# Patient Record
Sex: FEMALE | Race: ASIAN | NOT HISPANIC OR LATINO | URBAN - METROPOLITAN AREA
[De-identification: names, ages, dates, MRNs, and addresses within clinical notes are randomized per-mention and may not be internally consistent; named-entity substitution may affect disease eponyms.]

---

## 2017-06-01 ENCOUNTER — COMMUNICATION - HEALTHEAST (OUTPATIENT)
Dept: SCHEDULING | Facility: CLINIC | Age: 42
End: 2017-06-01

## 2017-06-02 ENCOUNTER — OFFICE VISIT - HEALTHEAST (OUTPATIENT)
Dept: FAMILY MEDICINE | Facility: CLINIC | Age: 42
End: 2017-06-02

## 2017-06-02 ENCOUNTER — HOSPITAL ENCOUNTER (OUTPATIENT)
Dept: ULTRASOUND IMAGING | Facility: HOSPITAL | Age: 42
Discharge: HOME | End: 2017-06-02
Attending: FAMILY MEDICINE

## 2017-06-02 DIAGNOSIS — N94.6 SEVERE MENSTRUAL CRAMPS: ICD-10-CM

## 2017-06-02 ASSESSMENT — MIFFLIN-ST. JEOR: SCORE: 1358.49

## 2017-06-09 ENCOUNTER — COMMUNICATION - HEALTHEAST (OUTPATIENT)
Dept: FAMILY MEDICINE | Facility: CLINIC | Age: 42
End: 2017-06-09

## 2017-07-05 ENCOUNTER — COMMUNICATION - HEALTHEAST (OUTPATIENT)
Dept: FAMILY MEDICINE | Facility: CLINIC | Age: 42
End: 2017-07-05

## 2017-10-24 ENCOUNTER — COMMUNICATION - HEALTHEAST (OUTPATIENT)
Dept: SCHEDULING | Facility: CLINIC | Age: 42
End: 2017-10-24

## 2017-10-24 ENCOUNTER — OFFICE VISIT - HEALTHEAST (OUTPATIENT)
Dept: FAMILY MEDICINE | Facility: CLINIC | Age: 42
End: 2017-10-24

## 2017-10-24 DIAGNOSIS — F43.21 GRIEF REACTION: ICD-10-CM

## 2017-10-24 DIAGNOSIS — G47.00 INSOMNIA: ICD-10-CM

## 2017-10-24 DIAGNOSIS — G44.201 ACUTE INTRACTABLE TENSION-TYPE HEADACHE: ICD-10-CM

## 2017-10-24 ASSESSMENT — MIFFLIN-ST. JEOR: SCORE: 1257.34

## 2017-10-25 ENCOUNTER — COMMUNICATION - HEALTHEAST (OUTPATIENT)
Dept: FAMILY MEDICINE | Facility: CLINIC | Age: 42
End: 2017-10-25

## 2017-10-31 ENCOUNTER — OFFICE VISIT - HEALTHEAST (OUTPATIENT)
Dept: FAMILY MEDICINE | Facility: CLINIC | Age: 42
End: 2017-10-31

## 2017-10-31 DIAGNOSIS — F43.21 GRIEF REACTION: ICD-10-CM

## 2017-10-31 DIAGNOSIS — G47.00 INSOMNIA: ICD-10-CM

## 2017-10-31 DIAGNOSIS — J02.0 STREP PHARYNGITIS: ICD-10-CM

## 2017-10-31 DIAGNOSIS — G44.201 ACUTE INTRACTABLE TENSION-TYPE HEADACHE: ICD-10-CM

## 2017-10-31 RX ORDER — HYDROXYZINE PAMOATE 50 MG/1
50 CAPSULE ORAL 3 TIMES DAILY PRN
Qty: 30 CAPSULE | Refills: 0 | Status: SHIPPED | OUTPATIENT
Start: 2017-10-31

## 2017-10-31 ASSESSMENT — MIFFLIN-ST. JEOR: SCORE: 1267.32

## 2017-11-01 ENCOUNTER — COMMUNICATION - HEALTHEAST (OUTPATIENT)
Dept: FAMILY MEDICINE | Facility: CLINIC | Age: 42
End: 2017-11-01

## 2017-11-14 ENCOUNTER — OFFICE VISIT - HEALTHEAST (OUTPATIENT)
Dept: BEHAVIORAL HEALTH | Facility: CLINIC | Age: 42
End: 2017-11-14

## 2017-11-14 DIAGNOSIS — F43.21 GRIEF REACTION: ICD-10-CM

## 2017-11-14 DIAGNOSIS — F43.23 ADJUSTMENT DISORDER WITH MIXED ANXIETY AND DEPRESSED MOOD: ICD-10-CM

## 2017-11-29 ENCOUNTER — OFFICE VISIT - HEALTHEAST (OUTPATIENT)
Dept: BEHAVIORAL HEALTH | Facility: CLINIC | Age: 42
End: 2017-11-29

## 2017-11-29 DIAGNOSIS — F43.23 ADJUSTMENT DISORDER WITH MIXED ANXIETY AND DEPRESSED MOOD: ICD-10-CM

## 2017-11-29 DIAGNOSIS — F43.21 GRIEF REACTION: ICD-10-CM

## 2017-12-05 ENCOUNTER — AMBULATORY - HEALTHEAST (OUTPATIENT)
Dept: BEHAVIORAL HEALTH | Facility: CLINIC | Age: 42
End: 2017-12-05

## 2017-12-15 ENCOUNTER — OFFICE VISIT - HEALTHEAST (OUTPATIENT)
Dept: FAMILY MEDICINE | Facility: CLINIC | Age: 42
End: 2017-12-15

## 2017-12-15 DIAGNOSIS — B18.1 HEPATITIS B CARRIER (H): ICD-10-CM

## 2017-12-15 DIAGNOSIS — Z80.0 FAMILY HISTORY OF MALIGNANT NEOPLASM OF COLON IN FIRST DEGREE RELATIVE DIAGNOSED WHEN YOUNGER THAN 60 YEARS OF AGE: ICD-10-CM

## 2017-12-15 DIAGNOSIS — Z12.4 SCREENING FOR CERVICAL CANCER: ICD-10-CM

## 2017-12-15 DIAGNOSIS — F43.21 GRIEF REACTION: ICD-10-CM

## 2017-12-15 DIAGNOSIS — Z00.00 ROUTINE GENERAL MEDICAL EXAMINATION AT A HEALTH CARE FACILITY: ICD-10-CM

## 2017-12-15 DIAGNOSIS — N94.6 DYSMENORRHEA: ICD-10-CM

## 2017-12-15 DIAGNOSIS — E66.3 OVERWEIGHT (BMI 25.0-29.9): ICD-10-CM

## 2017-12-15 DIAGNOSIS — Z86.32 HISTORY OF GESTATIONAL DIABETES: ICD-10-CM

## 2017-12-15 DIAGNOSIS — Z72.0 TOBACCO USE: ICD-10-CM

## 2017-12-15 DIAGNOSIS — G47.00 INSOMNIA: ICD-10-CM

## 2017-12-15 LAB
CHOLEST SERPL-MCNC: 232 MG/DL
FASTING STATUS PATIENT QL REPORTED: YES
HBA1C MFR BLD: 6.9 % (ref 3.5–6)
HDLC SERPL-MCNC: 37 MG/DL
LDLC SERPL CALC-MCNC: 174 MG/DL
TRIGL SERPL-MCNC: 105 MG/DL

## 2017-12-15 ASSESSMENT — MIFFLIN-ST. JEOR: SCORE: 1249.17

## 2017-12-15 NOTE — ASSESSMENT & PLAN NOTE
Severe grief reaction/ +/- adjustment disorder.  Improving with psychotherapy and time.  Continue to monitor carefully.

## 2017-12-15 NOTE — ASSESSMENT & PLAN NOTE
Pre-contemplative to quiting.  Very high stress time in her life currently, but could pursue when more socially stable.

## 2017-12-15 NOTE — ASSESSMENT & PLAN NOTE
Father had colon cancer at a young age and  from it.  Last colonoscopy for this patient was .  Colonoscopy every 5 years.

## 2017-12-15 NOTE — ASSESSMENT & PLAN NOTE
I discovered this dx in outside records after she left.  Will see if we can add on monitoring orders.

## 2017-12-15 NOTE — ASSESSMENT & PLAN NOTE
Weight up and down recently with grief.  As mental health improving, would strongly encourage weight loss.  Wt Readings from Last 3 Encounters:   12/15/17 148 lb (67.1 kg)   10/31/17 152 lb (68.9 kg)   10/24/17 150 lb 8 oz (68.3 kg)

## 2017-12-15 NOTE — ASSESSMENT & PLAN NOTE
A1C actually came back in diabetic range 6.9, fasting blood sugar in pre-diabetic range (119).  Would advise starting with lifestyle modification, could certainly add metformin now if pt wants, orhold off to response to lifestyle.

## 2017-12-19 ENCOUNTER — AMBULATORY - HEALTHEAST (OUTPATIENT)
Dept: BEHAVIORAL HEALTH | Facility: CLINIC | Age: 42
End: 2017-12-19

## 2017-12-21 ENCOUNTER — OFFICE VISIT - HEALTHEAST (OUTPATIENT)
Dept: BEHAVIORAL HEALTH | Facility: CLINIC | Age: 42
End: 2017-12-21

## 2017-12-21 DIAGNOSIS — F43.21 GRIEF REACTION: ICD-10-CM

## 2017-12-21 DIAGNOSIS — F43.23 ADJUSTMENT DISORDER WITH MIXED ANXIETY AND DEPRESSED MOOD: ICD-10-CM

## 2017-12-22 ENCOUNTER — COMMUNICATION - HEALTHEAST (OUTPATIENT)
Dept: FAMILY MEDICINE | Facility: CLINIC | Age: 42
End: 2017-12-22

## 2017-12-22 DIAGNOSIS — E11.9 TYPE 2 DIABETES MELLITUS WITHOUT COMPLICATION, WITHOUT LONG-TERM CURRENT USE OF INSULIN (H): ICD-10-CM

## 2017-12-22 DIAGNOSIS — E78.5 HYPERLIPIDEMIA: ICD-10-CM

## 2017-12-22 LAB
HBV DNA SERPL NAA+PROBE-ACNC: 5345 [IU]/ML
HBV DNA SERPL NAA+PROBE-LOG IU: 3.7 {LOG_IU}/ML

## 2018-07-30 ENCOUNTER — COMMUNICATION - HEALTHEAST (OUTPATIENT)
Dept: FAMILY MEDICINE | Facility: CLINIC | Age: 43
End: 2018-07-30

## 2018-10-09 ENCOUNTER — COMMUNICATION - HEALTHEAST (OUTPATIENT)
Dept: FAMILY MEDICINE | Facility: CLINIC | Age: 43
End: 2018-10-09

## 2019-07-16 ENCOUNTER — OFFICE VISIT - HEALTHEAST (OUTPATIENT)
Dept: FAMILY MEDICINE | Facility: CLINIC | Age: 44
End: 2019-07-16

## 2019-07-16 DIAGNOSIS — Z00.00 ROUTINE GENERAL MEDICAL EXAMINATION AT A HEALTH CARE FACILITY: ICD-10-CM

## 2019-07-16 DIAGNOSIS — E11.9 TYPE 2 DIABETES MELLITUS WITHOUT COMPLICATION (H): ICD-10-CM

## 2019-07-16 DIAGNOSIS — G43.909 MIGRAINE WITHOUT STATUS MIGRAINOSUS, NOT INTRACTABLE, UNSPECIFIED MIGRAINE TYPE: ICD-10-CM

## 2019-07-16 DIAGNOSIS — B19.10 HEPATITIS B INFECTION WITHOUT DELTA AGENT WITHOUT HEPATIC COMA, UNSPECIFIED CHRONICITY: ICD-10-CM

## 2019-07-16 DIAGNOSIS — Z23 IMMUNIZATION DUE: ICD-10-CM

## 2019-07-16 DIAGNOSIS — R12 HEARTBURN: ICD-10-CM

## 2019-07-16 LAB
ALBUMIN SERPL-MCNC: 3.8 G/DL (ref 3.5–5)
ALP SERPL-CCNC: 71 U/L (ref 45–120)
ALT SERPL W P-5'-P-CCNC: 26 U/L (ref 0–45)
ANION GAP SERPL CALCULATED.3IONS-SCNC: 9 MMOL/L (ref 5–18)
AST SERPL W P-5'-P-CCNC: 16 U/L (ref 0–40)
BILIRUB SERPL-MCNC: 0.3 MG/DL (ref 0–1)
BUN SERPL-MCNC: 7 MG/DL (ref 8–22)
CALCIUM SERPL-MCNC: 9.8 MG/DL (ref 8.5–10.5)
CHLORIDE BLD-SCNC: 102 MMOL/L (ref 98–107)
CHOLEST SERPL-MCNC: 221 MG/DL
CO2 SERPL-SCNC: 26 MMOL/L (ref 22–31)
CREAT SERPL-MCNC: 0.68 MG/DL (ref 0.6–1.1)
CREAT UR-MCNC: 57.8 MG/DL
FASTING STATUS PATIENT QL REPORTED: NO
GFR SERPL CREATININE-BSD FRML MDRD: >60 ML/MIN/1.73M2
GLUCOSE BLD-MCNC: 169 MG/DL (ref 70–125)
HBA1C MFR BLD: 8.4 % (ref 3.5–6)
HDLC SERPL-MCNC: 40 MG/DL
LDLC SERPL CALC-MCNC: 121 MG/DL
MICROALBUMIN UR-MCNC: 1.3 MG/DL (ref 0–1.99)
MICROALBUMIN/CREAT UR: 22.5 MG/G
POTASSIUM BLD-SCNC: 4 MMOL/L (ref 3.5–5)
PROT SERPL-MCNC: 7.3 G/DL (ref 6–8)
SODIUM SERPL-SCNC: 137 MMOL/L (ref 136–145)
TRIGL SERPL-MCNC: 302 MG/DL

## 2019-07-16 ASSESSMENT — MIFFLIN-ST. JEOR: SCORE: 1294.99

## 2019-07-17 LAB
HPV SOURCE: NORMAL
HUMAN PAPILLOMA VIRUS 16 DNA: NEGATIVE
HUMAN PAPILLOMA VIRUS 18 DNA: NEGATIVE
HUMAN PAPILLOMA VIRUS FINAL DIAGNOSIS: NORMAL
HUMAN PAPILLOMA VIRUS OTHER HR: NEGATIVE
SPECIMEN DESCRIPTION: NORMAL

## 2019-07-19 ENCOUNTER — COMMUNICATION - HEALTHEAST (OUTPATIENT)
Dept: FAMILY MEDICINE | Facility: CLINIC | Age: 44
End: 2019-07-19

## 2019-07-19 LAB
HBV DNA SERPL NAA+PROBE-ACNC: 2497 [IU]/ML
HBV DNA SERPL NAA+PROBE-LOG IU: 3.4 {LOG_IU}/ML

## 2019-07-22 ENCOUNTER — COMMUNICATION - HEALTHEAST (OUTPATIENT)
Dept: FAMILY MEDICINE | Facility: CLINIC | Age: 44
End: 2019-07-22

## 2019-07-23 ENCOUNTER — COMMUNICATION - HEALTHEAST (OUTPATIENT)
Dept: FAMILY MEDICINE | Facility: CLINIC | Age: 44
End: 2019-07-23

## 2019-10-01 ENCOUNTER — COMMUNICATION - HEALTHEAST (OUTPATIENT)
Dept: FAMILY MEDICINE | Facility: CLINIC | Age: 44
End: 2019-10-01

## 2019-12-09 ENCOUNTER — COMMUNICATION - HEALTHEAST (OUTPATIENT)
Dept: FAMILY MEDICINE | Facility: CLINIC | Age: 44
End: 2019-12-09

## 2020-01-07 ENCOUNTER — COMMUNICATION - HEALTHEAST (OUTPATIENT)
Dept: FAMILY MEDICINE | Facility: CLINIC | Age: 45
End: 2020-01-07

## 2020-02-17 ENCOUNTER — COMMUNICATION - HEALTHEAST (OUTPATIENT)
Dept: SCHEDULING | Facility: CLINIC | Age: 45
End: 2020-02-17

## 2020-02-18 ENCOUNTER — HOSPITAL ENCOUNTER (OUTPATIENT)
Dept: CARDIOLOGY | Facility: HOSPITAL | Age: 45
Discharge: HOME | End: 2020-02-18
Attending: FAMILY MEDICINE

## 2020-02-18 ENCOUNTER — OFFICE VISIT - HEALTHEAST (OUTPATIENT)
Dept: FAMILY MEDICINE | Facility: CLINIC | Age: 45
End: 2020-02-18

## 2020-02-18 DIAGNOSIS — N89.8 VAGINAL ITCHING: ICD-10-CM

## 2020-02-18 DIAGNOSIS — R06.83 SNORING: ICD-10-CM

## 2020-02-18 DIAGNOSIS — R00.2 PALPITATIONS: ICD-10-CM

## 2020-02-18 DIAGNOSIS — E11.9 TYPE 2 DIABETES MELLITUS WITHOUT COMPLICATION (H): ICD-10-CM

## 2020-02-18 LAB
CHOLEST SERPL-MCNC: 228 MG/DL
CLUE CELLS: NORMAL
FASTING STATUS PATIENT QL REPORTED: NO
HBA1C MFR BLD: 9.7 % (ref 3.5–6)
HDLC SERPL-MCNC: 39 MG/DL
LDLC SERPL CALC-MCNC: 144 MG/DL
TRICHOMONAS, WET PREP: NORMAL
TRIGL SERPL-MCNC: 225 MG/DL
TSH SERPL DL<=0.005 MIU/L-ACNC: 1 UIU/ML (ref 0.3–5)
YEAST, WET PREP: NORMAL

## 2020-02-18 ASSESSMENT — MIFFLIN-ST. JEOR: SCORE: 1283.93

## 2020-02-18 ASSESSMENT — PATIENT HEALTH QUESTIONNAIRE - PHQ9: SUM OF ALL RESPONSES TO PHQ QUESTIONS 1-9: 3

## 2020-02-20 ENCOUNTER — COMMUNICATION - HEALTHEAST (OUTPATIENT)
Dept: FAMILY MEDICINE | Facility: CLINIC | Age: 45
End: 2020-02-20

## 2020-02-21 ENCOUNTER — COMMUNICATION - HEALTHEAST (OUTPATIENT)
Dept: FAMILY MEDICINE | Facility: CLINIC | Age: 45
End: 2020-02-21

## 2020-02-21 ENCOUNTER — AMBULATORY - HEALTHEAST (OUTPATIENT)
Dept: FAMILY MEDICINE | Facility: CLINIC | Age: 45
End: 2020-02-21

## 2020-02-21 DIAGNOSIS — E78.2 MIXED HYPERLIPIDEMIA: ICD-10-CM

## 2020-03-30 ENCOUNTER — COMMUNICATION - HEALTHEAST (OUTPATIENT)
Dept: FAMILY MEDICINE | Facility: CLINIC | Age: 45
End: 2020-03-30

## 2020-04-03 ENCOUNTER — OFFICE VISIT - HEALTHEAST (OUTPATIENT)
Dept: FAMILY MEDICINE | Facility: CLINIC | Age: 45
End: 2020-04-03

## 2020-04-03 DIAGNOSIS — E11.9 TYPE 2 DIABETES MELLITUS WITHOUT COMPLICATION, WITHOUT LONG-TERM CURRENT USE OF INSULIN (H): ICD-10-CM

## 2020-09-10 ENCOUNTER — AMBULATORY - HEALTHEAST (OUTPATIENT)
Dept: FAMILY MEDICINE | Facility: CLINIC | Age: 45
End: 2020-09-10

## 2020-09-10 ENCOUNTER — COMMUNICATION - HEALTHEAST (OUTPATIENT)
Dept: FAMILY MEDICINE | Facility: CLINIC | Age: 45
End: 2020-09-10

## 2020-09-10 DIAGNOSIS — E78.2 MIXED HYPERLIPIDEMIA: ICD-10-CM

## 2020-09-10 DIAGNOSIS — E78.5 HYPERLIPIDEMIA, UNSPECIFIED HYPERLIPIDEMIA TYPE: ICD-10-CM

## 2020-09-10 RX ORDER — ATORVASTATIN CALCIUM 40 MG/1
40 TABLET, FILM COATED ORAL DAILY
Qty: 90 TABLET | Refills: 1 | Status: SHIPPED | OUTPATIENT
Start: 2020-09-10

## 2020-09-23 ENCOUNTER — OFFICE VISIT - HEALTHEAST (OUTPATIENT)
Dept: FAMILY MEDICINE | Facility: CLINIC | Age: 45
End: 2020-09-23

## 2020-09-23 DIAGNOSIS — N30.00 ACUTE CYSTITIS WITHOUT HEMATURIA: ICD-10-CM

## 2020-09-23 DIAGNOSIS — E11.9 TYPE 2 DIABETES MELLITUS WITHOUT COMPLICATION, WITHOUT LONG-TERM CURRENT USE OF INSULIN (H): ICD-10-CM

## 2020-09-23 DIAGNOSIS — E78.2 MIXED HYPERLIPIDEMIA: ICD-10-CM

## 2020-09-23 DIAGNOSIS — R10.84 ABDOMINAL PAIN, GENERALIZED: ICD-10-CM

## 2020-09-23 LAB
ALBUMIN SERPL-MCNC: 3.7 G/DL (ref 3.5–5)
ALBUMIN UR-MCNC: ABNORMAL MG/DL
ALP SERPL-CCNC: 78 U/L (ref 45–120)
ALT SERPL W P-5'-P-CCNC: 18 U/L (ref 0–45)
ANION GAP SERPL CALCULATED.3IONS-SCNC: 7 MMOL/L (ref 5–18)
APPEARANCE UR: ABNORMAL
AST SERPL W P-5'-P-CCNC: 12 U/L (ref 0–40)
BACTERIA #/AREA URNS HPF: ABNORMAL HPF
BASOPHILS # BLD AUTO: 0 THOU/UL (ref 0–0.2)
BASOPHILS NFR BLD AUTO: 1 % (ref 0–2)
BILIRUB SERPL-MCNC: 0.4 MG/DL (ref 0–1)
BILIRUB UR QL STRIP: NEGATIVE
BUN SERPL-MCNC: 13 MG/DL (ref 8–22)
CALCIUM SERPL-MCNC: 9.6 MG/DL (ref 8.5–10.5)
CHLORIDE BLD-SCNC: 105 MMOL/L (ref 98–107)
CHOLEST SERPL-MCNC: 198 MG/DL
CO2 SERPL-SCNC: 28 MMOL/L (ref 22–31)
COLOR UR AUTO: YELLOW
CREAT SERPL-MCNC: 0.68 MG/DL (ref 0.6–1.1)
CREAT UR-MCNC: 137.6 MG/DL
EOSINOPHIL # BLD AUTO: 0.3 THOU/UL (ref 0–0.4)
EOSINOPHIL NFR BLD AUTO: 4 % (ref 0–6)
ERYTHROCYTE [DISTWIDTH] IN BLOOD BY AUTOMATED COUNT: 12.7 % (ref 11–14.5)
FASTING STATUS PATIENT QL REPORTED: YES
GFR SERPL CREATININE-BSD FRML MDRD: >60 ML/MIN/1.73M2
GLUCOSE BLD-MCNC: 192 MG/DL (ref 70–125)
GLUCOSE UR STRIP-MCNC: ABNORMAL MG/DL
HBA1C MFR BLD: 7.9 %
HCT VFR BLD AUTO: 35.9 % (ref 35–47)
HDLC SERPL-MCNC: 44 MG/DL
HGB BLD-MCNC: 12.3 G/DL (ref 12–16)
HGB UR QL STRIP: ABNORMAL
KETONES UR STRIP-MCNC: ABNORMAL MG/DL
LDLC SERPL CALC-MCNC: 130 MG/DL
LEUKOCYTE ESTERASE UR QL STRIP: NEGATIVE
LIPASE SERPL-CCNC: 20 U/L (ref 0–52)
LYMPHOCYTES # BLD AUTO: 2.2 THOU/UL (ref 0.8–4.4)
LYMPHOCYTES NFR BLD AUTO: 29 % (ref 20–40)
MCH RBC QN AUTO: 29.1 PG (ref 27–34)
MCHC RBC AUTO-ENTMCNC: 34.1 G/DL (ref 32–36)
MCV RBC AUTO: 85 FL (ref 80–100)
MICROALBUMIN UR-MCNC: 2.01 MG/DL (ref 0–1.99)
MICROALBUMIN/CREAT UR: 14.6 MG/G
MONOCYTES # BLD AUTO: 0.5 THOU/UL (ref 0–0.9)
MONOCYTES NFR BLD AUTO: 7 % (ref 2–10)
NEUTROPHILS # BLD AUTO: 4.6 THOU/UL (ref 2–7.7)
NEUTROPHILS NFR BLD AUTO: 60 % (ref 50–70)
NITRATE UR QL: POSITIVE
PH UR STRIP: 5.5 [PH] (ref 5–8)
PLATELET # BLD AUTO: 279 THOU/UL (ref 140–440)
PMV BLD AUTO: 7.8 FL (ref 7–10)
POTASSIUM BLD-SCNC: 4.2 MMOL/L (ref 3.5–5)
PROT SERPL-MCNC: 7 G/DL (ref 6–8)
RBC # BLD AUTO: 4.21 MILL/UL (ref 3.8–5.4)
RBC #/AREA URNS AUTO: ABNORMAL HPF
SODIUM SERPL-SCNC: 140 MMOL/L (ref 136–145)
SP GR UR STRIP: >=1.03 (ref 1–1.03)
SQUAMOUS #/AREA URNS AUTO: ABNORMAL LPF
TRIGL SERPL-MCNC: 119 MG/DL
UROBILINOGEN UR STRIP-ACNC: ABNORMAL
WBC #/AREA URNS AUTO: ABNORMAL HPF
WBC CLUMPS #/AREA URNS HPF: PRESENT /[HPF]
WBC: 7.6 THOU/UL (ref 4–11)

## 2020-09-23 ASSESSMENT — MIFFLIN-ST. JEOR: SCORE: 1251.15

## 2020-09-25 LAB — BACTERIA SPEC CULT: ABNORMAL

## 2020-10-06 ENCOUNTER — OFFICE VISIT - HEALTHEAST (OUTPATIENT)
Dept: FAMILY MEDICINE | Facility: CLINIC | Age: 45
End: 2020-10-06

## 2020-10-06 DIAGNOSIS — R10.9 FLANK PAIN: ICD-10-CM

## 2020-10-06 RX ORDER — CELECOXIB 200 MG/1
200 CAPSULE ORAL 2 TIMES DAILY
Qty: 60 CAPSULE | Refills: 2 | Status: SHIPPED | OUTPATIENT
Start: 2020-10-06

## 2020-10-06 ASSESSMENT — MIFFLIN-ST. JEOR: SCORE: 1261.26

## 2021-01-07 ENCOUNTER — COMMUNICATION - HEALTHEAST (OUTPATIENT)
Dept: SCHEDULING | Facility: CLINIC | Age: 46
End: 2021-01-07

## 2021-01-08 ENCOUNTER — OFFICE VISIT - HEALTHEAST (OUTPATIENT)
Dept: FAMILY MEDICINE | Facility: CLINIC | Age: 46
End: 2021-01-08

## 2021-01-08 DIAGNOSIS — R07.9 LEFT-SIDED CHEST PAIN: ICD-10-CM

## 2021-01-08 DIAGNOSIS — E78.2 MIXED HYPERLIPIDEMIA: ICD-10-CM

## 2021-01-08 DIAGNOSIS — E11.9 TYPE 2 DIABETES MELLITUS WITHOUT COMPLICATION, WITHOUT LONG-TERM CURRENT USE OF INSULIN (H): ICD-10-CM

## 2021-01-08 DIAGNOSIS — M54.2 NECK PAIN: ICD-10-CM

## 2021-01-08 RX ORDER — CYCLOBENZAPRINE HCL 5 MG
5 TABLET ORAL 2 TIMES DAILY PRN
Qty: 20 TABLET | Refills: 0 | Status: SHIPPED | OUTPATIENT
Start: 2021-01-08

## 2021-01-08 RX ORDER — GLUCOSAMINE HCL/CHONDROITIN SU 500-400 MG
1 CAPSULE ORAL PRN
Qty: 200 STRIP | Refills: 11 | Status: SHIPPED | OUTPATIENT
Start: 2021-01-08

## 2021-01-08 ASSESSMENT — MIFFLIN-ST. JEOR: SCORE: 1268.34

## 2021-01-13 ENCOUNTER — COMMUNICATION - HEALTHEAST (OUTPATIENT)
Dept: CARDIOLOGY | Facility: CLINIC | Age: 46
End: 2021-01-13

## 2021-01-14 ENCOUNTER — OFFICE VISIT - HEALTHEAST (OUTPATIENT)
Dept: CARDIOLOGY | Facility: CLINIC | Age: 46
End: 2021-01-14

## 2021-01-14 DIAGNOSIS — E11.9 TYPE 2 DIABETES MELLITUS WITHOUT COMPLICATION (H): ICD-10-CM

## 2021-01-14 DIAGNOSIS — Z72.0 TOBACCO USE: ICD-10-CM

## 2021-01-14 DIAGNOSIS — E78.2 MIXED HYPERLIPIDEMIA: ICD-10-CM

## 2021-01-14 DIAGNOSIS — I20.89 ATYPICAL ANGINA (H): ICD-10-CM

## 2021-01-14 DIAGNOSIS — R07.89 ATYPICAL CHEST PAIN: ICD-10-CM

## 2021-01-14 LAB
ATRIAL RATE - MUSE: 74 BPM
CHOLEST SERPL-MCNC: 131 MG/DL
DIASTOLIC BLOOD PRESSURE - MUSE: NORMAL
ERYTHROCYTE [SEDIMENTATION RATE] IN BLOOD BY WESTERGREN METHOD: 25 MM/HR (ref 0–20)
FASTING STATUS PATIENT QL REPORTED: YES
HDLC SERPL-MCNC: 40 MG/DL
INTERPRETATION ECG - MUSE: NORMAL
P AXIS - MUSE: 37 DEGREES
PR INTERVAL - MUSE: 124 MS
QRS DURATION - MUSE: 100 MS
QT - MUSE: 402 MS
QTC - MUSE: 446 MS
R AXIS - MUSE: 61 DEGREES
SYSTOLIC BLOOD PRESSURE - MUSE: NORMAL
T AXIS - MUSE: -1 DEGREES
VENTRICULAR RATE- MUSE: 74 BPM

## 2021-02-11 ENCOUNTER — HOSPITAL ENCOUNTER (OUTPATIENT)
Dept: CT IMAGING | Facility: CLINIC | Age: 46
Discharge: HOME | End: 2021-02-11
Attending: INTERNAL MEDICINE

## 2021-02-11 ENCOUNTER — AMBULATORY - HEALTHEAST (OUTPATIENT)
Dept: LAB | Facility: CLINIC | Age: 46
End: 2021-02-11

## 2021-02-11 ENCOUNTER — HOSPITAL ENCOUNTER (OUTPATIENT)
Dept: CARDIOLOGY | Facility: CLINIC | Age: 46
Discharge: HOME | End: 2021-02-11
Attending: INTERNAL MEDICINE

## 2021-02-11 ENCOUNTER — COMMUNICATION - HEALTHEAST (OUTPATIENT)
Dept: CARDIOLOGY | Facility: CLINIC | Age: 46
End: 2021-02-11

## 2021-02-11 DIAGNOSIS — I20.89 ATYPICAL ANGINA (H): ICD-10-CM

## 2021-02-11 DIAGNOSIS — Z01.818 PREOPERATIVE EXAMINATION: ICD-10-CM

## 2021-02-11 LAB
AORTIC ROOT: 3 CM
AORTIC VALVE MEAN VELOCITY: 92.4 CM/S
ASCENDING AORTA: 3.5 CM
AV DIMENSIONLESS INDEX VTI: 0.8
AV MEAN GRADIENT: 4 MMHG
AV PEAK GRADIENT: 8.1 MMHG
AV VALVE AREA: 2.2 CM2
AV VELOCITY RATIO: 0.6
BSA FOR ECHO PROCEDURE: 1.7 M2
BSA FOR ECHO PROCEDURE: 1.7 M2
CREAT BLD-MCNC: 0.5 MG/DL (ref 0.6–1.1)
CV BLOOD PRESSURE: ABNORMAL MMHG
CV CALCIUM SCORE AGATSTON LM: 0
CV CALCIUM SCORING AGATSON LAD: 0
CV CALCIUM SCORING AGATSTON CX: 0
CV CALCIUM SCORING AGATSTON RCA: 0
CV CALCIUM SCORING AGATSTON TOTAL: 0
CV ECHO HEIGHT: 61 IN
CV ECHO WEIGHT: 148 LBS
DOP CALC AO PEAK VEL: 142 CM/S
DOP CALC AO VTI: 30.2 CM
DOP CALC LVOT AREA: 2.83 CM2
DOP CALC LVOT DIAMETER: 1.9 CM
DOP CALC LVOT PEAK VEL: 86.9 CM/S
DOP CALC LVOT STROKE VOLUME: 66.6 CM3
DOP CALCLVOT PEAK VEL VTI: 23.5 CM
EJECTION FRACTION: 58 % (ref 55–75)
FRACTIONAL SHORTENING: 34 % (ref 28–44)
GFR SERPL CREATININE-BSD FRML MDRD: >60 ML/MIN/1.73M2
HCG SERPL QL: NEGATIVE
INTERVENTRICULAR SEPTUM IN END DIASTOLE: 0.9 CM (ref 0.6–0.9)
IVS/PW RATIO: 0.9
LA AREA 1: 12.8 CM2
LA AREA 2: 16.6 CM2
LEFT ATRIUM LENGTH: 4.35 CM
LEFT ATRIUM SIZE: 3.2 CM
LEFT ATRIUM VOLUME INDEX: 24.4 ML/M2
LEFT ATRIUM VOLUME: 41.5 ML
LEFT VENTRICLE CARDIAC INDEX: 2.2 L/MIN/M2
LEFT VENTRICLE CARDIAC OUTPUT: 3.7 L/MIN
LEFT VENTRICLE DIASTOLIC VOLUME INDEX: 43.5 CM3/M2 (ref 29–61)
LEFT VENTRICLE DIASTOLIC VOLUME: 74 CM3 (ref 46–106)
LEFT VENTRICLE HEART RATE: 56 BPM
LEFT VENTRICLE HEART RATE: 62 BPM
LEFT VENTRICLE MASS INDEX: 100 G/M2
LEFT VENTRICLE SYSTOLIC VOLUME INDEX: 18.2 CM3/M2 (ref 8–24)
LEFT VENTRICLE SYSTOLIC VOLUME: 31 CM3 (ref 14–42)
LEFT VENTRICULAR INTERNAL DIMENSION IN DIASTOLE: 5 CM (ref 3.8–5.2)
LEFT VENTRICULAR INTERNAL DIMENSION IN SYSTOLE: 3.3 CM (ref 2.2–3.5)
LEFT VENTRICULAR MASS: 169.9 G
LEFT VENTRICULAR OUTFLOW TRACT MEAN GRADIENT: 2 MMHG
LEFT VENTRICULAR OUTFLOW TRACT MEAN VELOCITY: 57.7 CM/S
LEFT VENTRICULAR OUTFLOW TRACT PEAK GRADIENT: 3 MMHG
LEFT VENTRICULAR POSTERIOR WALL IN END DIASTOLE: 1 CM (ref 0.6–0.9)
LV STROKE VOLUME INDEX: 39.2 ML/M2
MITRAL VALVE E/A RATIO: 1.5
MV AVERAGE E/E' RATIO: 7.7 CM/S
MV DECELERATION TIME: 275 MS
MV E'TISSUE VEL-LAT: 13.4 CM/S
MV E'TISSUE VEL-MED: 7.02 CM/S
MV LATERAL E/E' RATIO: 5.9
MV MEDIAL E/E' RATIO: 11.2
MV PEAK A VELOCITY: 51 CM/S
MV PEAK E VELOCITY: 78.4 CM/S
NUC REST DIASTOLIC VOLUME INDEX: 2368 LBS
NUC REST SYSTOLIC VOLUME INDEX: 61 IN
TRICUSPID REGURGITATION PEAK PRESSURE GRADIENT: 11.3 MMHG
TRICUSPID VALVE ANULAR PLANE SYSTOLIC EXCURSION: 2.4 CM
TRICUSPID VALVE PEAK REGURGITANT VELOCITY: 168 CM/S

## 2021-02-11 ASSESSMENT — MIFFLIN-ST. JEOR
SCORE: 1258.7

## 2021-03-04 ENCOUNTER — COMMUNICATION - HEALTHEAST (OUTPATIENT)
Dept: FAMILY MEDICINE | Facility: CLINIC | Age: 46
End: 2021-03-04

## 2021-03-17 ENCOUNTER — COMMUNICATION - HEALTHEAST (OUTPATIENT)
Dept: FAMILY MEDICINE | Facility: CLINIC | Age: 46
End: 2021-03-17

## 2021-03-17 DIAGNOSIS — E11.9 TYPE 2 DIABETES MELLITUS WITHOUT COMPLICATION, WITHOUT LONG-TERM CURRENT USE OF INSULIN (H): ICD-10-CM

## 2021-03-19 ENCOUNTER — COMMUNICATION - HEALTHEAST (OUTPATIENT)
Dept: FAMILY MEDICINE | Facility: CLINIC | Age: 46
End: 2021-03-19

## 2021-05-26 ASSESSMENT — PATIENT HEALTH QUESTIONNAIRE - PHQ9: SUM OF ALL RESPONSES TO PHQ QUESTIONS 1-9: 3

## 2021-05-30 NOTE — TELEPHONE ENCOUNTER
Who is calling:  Lee Damon w/ [facility]   Reason for Call:  Calling to obtain information on Patients Pregnancy status with (+) Hepatitis findings .  No (+) Pregnancy to report   Date of last appointment with primary care: 07/16/19  Okay to leave a detailed message: Yes

## 2021-05-30 NOTE — TELEPHONE ENCOUNTER
Patient advised per provider result note below. The patient indicates understanding of the result and instructions for follow up and continued care.

## 2021-05-30 NOTE — TELEPHONE ENCOUNTER
Patient advised per provider result note below. The patient indicates understanding of the result and instructions for follow up and continued care. The patient verbalizes understanding of provider/CSS instructions for follow-up and continued care per provider message.   Patient asking MD to advise regarding other test results from 07/16/19.

## 2021-05-30 NOTE — TELEPHONE ENCOUNTER
----- Message from Gareth Kimble MD sent at 7/19/2019  2:55 PM CDT -----  Hepatitis virus number lower than a year ago.  We will continue to monitor for now.  Please notify the patient.  Thank you,    Dr. Gareth Kimble  7/19/2019 2:55 PM

## 2021-05-30 NOTE — TELEPHONE ENCOUNTER
Liver, kidney tests are within normal range. Cholesterol is high but better than before. No changes need to be made.    Dr. Gareth Kimble  7/23/2019 7:36 AM

## 2021-05-30 NOTE — TELEPHONE ENCOUNTER
Physical note indicates that PCP verified that patient is not sexually active. Last pregnancy test in April (Negative).

## 2021-05-30 NOTE — TELEPHONE ENCOUNTER
The neg pregnancy test can be conveyed to the public health communicable disease dept.   It looks like the HBV is chronic and she is a known carrier and this is not a new dx.     Dr. Ashlyn Durham  7/22/2019

## 2021-05-30 NOTE — PROGRESS NOTES
Assessment:Plan   1. Routine general medical examination at a health care facility  Pap today  - PHQ9 Depression Screen  - Gynecologic Cytology (PAP Smear)    2. Type 2 diabetes mellitus without complication (H)  Will start on metformin.  Has history of gestational diabetes, knows how to use meter and testing supplies.  New prescription sent.  A1c 8.4 today  - Glycosylated Hemoglobin A1c  - Microalbumin, Random Urine  - Comprehensive Metabolic Panel  - Lipid [place] RANDOM  - Ambulatory referral to Ophthalmology  - blood glucose meter (GLUCOMETER); Use 1 each As Directed 2 (two) times a day. Dispense glucometer brand per patient's insurance at pharmacy discretion.  Dispense: 1 each; Refill: 0  - blood glucose test strips; Use 1 each As Directed as needed. Dispense brand per patient's insurance at pharmacy discretion.Test twice a day  Dispense: 200 strip; Refill: 11  - generic lancets (FINGERSTIX LANCETS); Dispense brand per patient's insurance at pharmacy discretion. Test twice a day  Dispense: 200 each; Refill: 11  - metFORMIN (GLUCOPHAGE) 500 MG tablet; Take 1 tablet (500 mg total) by mouth 2 (two) times a day with meals.  Dispense: 180 tablet; Refill: 3    3. Hepatitis B infection without delta agent without hepatic coma, unspecified chronicity  DNA quant 5345 and 12/17.  Recheck today along with LFTs.  GI referral if indicated.  - Hepatitis B DNA Quantitative Real-Time PCR(HBQNT)    4. Migraine without status migrainosus, not intractable, unspecified migraine type  Will likely need daily prophylactic medication.  Will start at next visit.  Started on metformin for diabetes today, will try to avoid starting multiple new medications.    5. Heartburn  Will consider H. pylori or upper GI.    6. Immunization due  - Td, Preservative Free (green label)    Subjective:      Jonathan Dominguez is a 43 y.o. female who presents for an annual exam. The patient is not sexually active.    (  passed away ). The patient  participates in regular exercise: yes. The patient reports that there is not domestic violence in her life.   LMP 19, have period every month but only spotting some months.     Migraine: Long history of migraine more than 10 years.  Getting headaches 2-3 times a week.  Excedrin usually works.  No acute headache today.    Diabetes: Has history of gestational diabetes.  Last A1c was 6.9 in 2017.  Was never told she has diabetes.  She is not taking any medication.  She knows check her blood sugar with the family members meter 2 weeks ago and it was more than 300.      Feels more tired lately.  Denied depression symptoms.    Healthy Habits:   Regular Exercise: Yes, during work hrs   Sunscreen Use: No  Healthy Diet: No  Dental Visits Regularly: No  Seat Belt: Yes  Sexually active: No  Self Breast Exam Monthly:Yes  Hemoccults: N/A  Flex Sig: N/A  Colonoscopy: N/A  Lipid Profile: Yes  Glucose Screen: Yes  Prevention of Osteoporosis: N/A  Last Dexa: No  Guns at Home:  No      Immunization History   Administered Date(s) Administered     Influenza, inj, historic,unspecified 10/12/2011     Tdap 2009     Immunization status: due today.    No exam data present    Gynecologic History  Patient's last menstrual period was 2019 (exact date).  Contraception: none  Last Pap: 2015. Results were: normal  Last mammogram: N/A.       OB History    Para Term  AB Living   8 6 5 1 2 7   SAB TAB Ectopic Multiple Live Births   2 0 0 1 7      # Outcome Date GA Lbr [place]/2nd Weight Sex Delivery Anes PTL Lv   8A          EUGENE   8B          EUGENE   7 SAB  4w0d          6 SAB  14w0d             Birth Comments: D&C needed   5 Term         EUGENE   4 Term         EUGENE   3 Term         EUGENE   2 Term         EUGENE   1 Term         EUGENE       Current Outpatient Medications   Medication Sig Dispense Refill     aspirin-acetaminophen-caffeine (EXCEDRIN MIGRAINE) 250-250-65 mg per tablet Take 1 tablet by mouth every 6  (six) hours as needed for pain.       omeprazole (PRILOSEC) 10 MG capsule Take 10 mg by mouth daily before breakfast.       hydrOXYzine (VISTARIL) 50 MG capsule Take 1 capsule (50 mg total) by mouth 3 (three) times a day as needed (anxiety or insomnia). 30 capsule 0     No current facility-administered medications for this visit.      Past Medical History:   Diagnosis Date     GERD (gastroesophageal reflux disease)      Gestational diabetes      Past Surgical History:   Procedure Laterality Date     DILATION AND CURETTAGE OF UTERUS      following miscarriage     Venom-honey bee  Family History   Problem Relation Age of Onset     Heart attack Mother      Diabetes Mother      Stroke Mother      Colon cancer Father         Dx age 46     Diabetes Sister      Diabetes Brother      Diabetes Sister      No Medical Problems Brother      No Medical Problems Brother      Diabetes Son      Brain cancer Niece         DIPG     Social History     Socioeconomic History     Marital status: Single     Spouse name: Not on file     Number of children: Not on file     Years of education: Not on file     Highest education level: Not on file   Occupational History     Not on file   Social Needs     Financial resource strain: Not on file     Food insecurity:     Worry: Not on file     Inability: Not on file     Transportation needs:     Medical: Not on file     Non-medical: Not on file   Tobacco Use     Smoking status: Current Some Day Smoker     Smokeless tobacco: Never Used   Substance and Sexual Activity     Alcohol use: No     Drug use: No     Sexual activity: Not Currently     Birth control/protection: None   Lifestyle     Physical activity:     Days per week: Not on file     Minutes per session: Not on file     Stress: Not on file   Relationships     Social connections:     Talks on phone: Not on file     Gets together: Not on file     Attends Hindu service: Not on file     Active member of club or organization: Not on file      Attends meetings of clubs or organizations: Not on file     Relationship status: Not on file     Intimate partner violence:     Fear of current or ex partner: Not on file     Emotionally abused: Not on file     Physically abused: Not on file     Forced sexual activity: Not on file   Other Topics Concern     Not on file   Social History Narrative    6/1/2017 - Seven biological children, 2 step-children, 9 children total.       Review of Systems  Review of Systems      No acute fever or URI symptoms.  No acute chest pain or shortness of breath.  No vaginal discharge or irritation.  Denied depression symptoms.  Objective:         Vitals:    07/16/19 1556   BP: 120/86   Pulse: 72   Resp: 16   Temp: 98.3  F (36.8  C)   TempSrc: Oral   Weight: 159 lb 8 oz (72.3 kg)   Height: 5' (1.524 m)     Body mass index is 31.15 kg/m .    Physical  Physical Exam     Gen - alert, orientated, NAD  Eyes - fundascopic exam limited by the undialated pupil but looks symmetric  ENT - oropharynx clear, TMs clear  Neck - supple, no palpable mass or lymphadenopathy  CV - RRR, no murmur  Breast -Deferred.  Resp - lungs CTA  Ab - soft, nontender, no palpable mass or organomegaly   - normal appearance to the external genitalia, vaginal mucosa normal, physiologic discharge, no palpable adenexal mass, cervix appears normal  Extrem - warm, no edema  Neuro - CN II-XII intact, strength, sensation, reflexes intact and symmetric  Skin - no rash.  No atypical appearing lesions seen.

## 2021-05-31 VITALS — BODY MASS INDEX: 25.99 KG/M2 | WEIGHT: 156 LBS | HEIGHT: 65 IN

## 2021-05-31 VITALS — WEIGHT: 152 LBS | HEIGHT: 60 IN | BODY MASS INDEX: 29.84 KG/M2

## 2021-05-31 VITALS — WEIGHT: 148 LBS | HEIGHT: 60 IN | BODY MASS INDEX: 29.06 KG/M2

## 2021-05-31 VITALS — HEIGHT: 60 IN | WEIGHT: 150.5 LBS | BODY MASS INDEX: 29.55 KG/M2

## 2021-06-01 ENCOUNTER — RECORDS - HEALTHEAST (OUTPATIENT)
Dept: ADMINISTRATIVE | Facility: CLINIC | Age: 46
End: 2021-06-01

## 2021-06-01 NOTE — TELEPHONE ENCOUNTER
Called patient to help schedule appointment per Dr.Za caballero 7/16/2019 Return in about 4 weeks (around 8/13/2019) for diabetes. Patient agree and was schedule for November 15, 2019 at 4 pm

## 2021-06-03 VITALS — BODY MASS INDEX: 31.31 KG/M2 | HEIGHT: 60 IN | WEIGHT: 159.5 LBS

## 2021-06-04 VITALS
TEMPERATURE: 98.2 F | WEIGHT: 157.06 LBS | BODY MASS INDEX: 30.83 KG/M2 | OXYGEN SATURATION: 97 % | SYSTOLIC BLOOD PRESSURE: 130 MMHG | HEIGHT: 60 IN | DIASTOLIC BLOOD PRESSURE: 86 MMHG | HEART RATE: 93 BPM | RESPIRATION RATE: 16 BRPM

## 2021-06-04 NOTE — TELEPHONE ENCOUNTER
Called to reschedule no show DM FU appt with Dr. Kimble on 11/15/2019 left vm. Please assist in rescheduling when patient calls back.

## 2021-06-05 VITALS
WEIGHT: 148 LBS | BODY MASS INDEX: 27.96 KG/M2 | HEART RATE: 89 BPM | DIASTOLIC BLOOD PRESSURE: 80 MMHG | SYSTOLIC BLOOD PRESSURE: 122 MMHG | OXYGEN SATURATION: 98 % | RESPIRATION RATE: 16 BRPM

## 2021-06-05 VITALS
RESPIRATION RATE: 18 BRPM | HEART RATE: 88 BPM | TEMPERATURE: 97.9 F | DIASTOLIC BLOOD PRESSURE: 86 MMHG | HEIGHT: 61 IN | BODY MASS INDEX: 28.05 KG/M2 | WEIGHT: 148.56 LBS | SYSTOLIC BLOOD PRESSURE: 146 MMHG

## 2021-06-05 VITALS
HEIGHT: 61 IN | WEIGHT: 146.25 LBS | SYSTOLIC BLOOD PRESSURE: 130 MMHG | TEMPERATURE: 98.8 F | RESPIRATION RATE: 18 BRPM | DIASTOLIC BLOOD PRESSURE: 84 MMHG | HEART RATE: 68 BPM | BODY MASS INDEX: 27.61 KG/M2 | OXYGEN SATURATION: 99 %

## 2021-06-05 VITALS — BODY MASS INDEX: 27.94 KG/M2 | HEIGHT: 61 IN | WEIGHT: 148 LBS

## 2021-06-05 VITALS — WEIGHT: 148 LBS | HEIGHT: 61 IN | BODY MASS INDEX: 27.94 KG/M2

## 2021-06-05 VITALS
RESPIRATION RATE: 18 BRPM | WEIGHT: 150.13 LBS | HEART RATE: 80 BPM | DIASTOLIC BLOOD PRESSURE: 88 MMHG | HEIGHT: 61 IN | SYSTOLIC BLOOD PRESSURE: 144 MMHG | BODY MASS INDEX: 28.35 KG/M2 | TEMPERATURE: 98.4 F

## 2021-06-06 NOTE — TELEPHONE ENCOUNTER
----- Message from Gareth Kimble MD sent at 2/20/2020  2:09 PM CST -----  Hear monitor result is normal.  Please let the patient know.    Thank you,    Dr. Gareth Kimble  2/20/2020 2:09 PM

## 2021-06-06 NOTE — PROGRESS NOTES
ASSESMENT AND PLAN:  Diagnoses and all orders for this visit:    Vaginal itching  -     Wet Prep, Vaginal  Negative wet prep   Advised to use OTC vaginal creams.  Will consider gyn referral.    Type 2 diabetes mellitus without complication (H)  -     Glycosylated Hemoglobin A1c  -     Lipid [place] RANDOM  -     metFORMIN (GLUCOPHAGE) 850 MG tablet; Take 1 tablet (850 mg total) by mouth 2 (two) times a day with meals.  Dispense: 180 tablet; Refill: 2  Increased metformin to a 50 mg twice a day.  Advised to take it twice a day as prescribed.  Follow-up in a few weeks with blood sugar log.    Palpitations  -     Holter Monitor; Future; Expected date: 02/18/2020  -     Thyroid Cascade    Snoring  -     Ambulatory referral to Sleep Medicine    This transcription uses voice recognition software, which may contain typographical errors.        SUBJECTIVE: Jonathan Dominguez is a 44-year-old female with history of diabetes here with few concerns.  She is been having vaginal itching for about 3 weeks.  No excessive  vaginal discharge or odor.  She is not sexually active currently, has not been for 3 years.  No abnormal vaginal bleeding.  No dysuria frequency or blood in the urine.  She is also concerned about episodes of palpitations almost every day.  No shortness of breath or chest pain with it.  The symptoms last for few minutes.  No acute palpitations today.  She is also concerned about her snoring.  She feels tired during the day, but not every day.  She would like to see sleep specialist.  She has history of diabetes.  She is currently on metformin 500 mg twice a day.  She has not been testing her blood sugar.  She admits that she missed her medications quite frequently.  She forgot her meter and testing supplies at her sister's house, her sister will bring it back to her in a few weeks.    Past Medical History:   Diagnosis Date     GERD (gastroesophageal reflux disease)      Gestational diabetes      Patient Active Problem  List   Diagnosis     Hepatitis B carrier (H)     Tobacco use     Overweight (BMI 25.0-29.9)     Grief reaction     Family history of colon cancer in 1st degree relative, age 46     History of gestational diabetes     Type 2 diabetes mellitus without complication (H)     Hyperlipidemia     Migraine without status migrainosus, not intractable, unspecified migraine type     Heartburn       Allergies:    Allergies   Allergen Reactions     Venom-Honey Bee Anaphylaxis       Social History     Tobacco Use   Smoking Status Current Some Day Smoker   Smokeless Tobacco Never Used       Review of systems otherwise negative except as listed in HPI.   Social History     Tobacco Use   Smoking Status Current Some Day Smoker   Smokeless Tobacco Never Used       OBJECTICE: /86 (Patient Site: Left Arm, Patient Position: Sitting, Cuff Size: Adult Regular)   Pulse 93   Temp 98.2  F (36.8  C) (Oral)   Resp 16   Ht 5' (1.524 m)   Wt 157 lb 1 oz (71.2 kg)   LMP 01/15/2020 (Exact Date)   SpO2 97%   BMI 30.67 kg/m      DATA REVIEWED:    Labs Reviewed or Ordered (1):       GEN-alert,  in no apparent distress.  HEENT-mucous membranes are moist, neck is supple.  CV-regular rate and rhythm with no murmur.   RESP-lungs clear to auscultation .  ABDOMEN- Soft , not tender.  EXTREM- Sensation intact. No open lesions or ulcers.   PELVIC-normal external genitalia.  No lesions.  Vaginal wall appears normal.  No speculum exam performed.  No cervical motion tenderness.  SKIN-normal        [facility]   2/18/2020

## 2021-06-06 NOTE — TELEPHONE ENCOUNTER
"Pt calling   Sx began 3 weeks ago  Pt hoping to be seen sooner for \"vaginal itching\"  Has tried Monistat however only used for one day  Denies discharge or odor but the itching is becoming  \"unbearable \"    Warm transfer to  for appointment.  appt scheduled for am 2/18/2020    Pt in agreement with plan     [place] RN  [place] Nurse Advisor      Reason for Disposition    MODERATE-SEVERE itching (i.e., interferes with school, work, or sleep)    Protocols used: VAGINAL SYMPTOMS-A-AH      "

## 2021-06-07 NOTE — PROGRESS NOTES
"Jonathan Dominguez is a 44 y.o. female who is being evaluated via a billable telephone visit.      The patient has been notified of following:     \"This telephone visit will be conducted via a call between you and your physician/provider. We have found that certain health care needs can be provided without the need for a physical exam.  This service lets us provide the care you need with a short phone conversation.  If a prescription is necessary we can send it directly to your pharmacy.  If lab work is needed we can place an order for that and you can then stop by our lab to have the test done at a later time\"       Patient has given verbal consent to a Telephone visit? Yes    Jonathan Dominguez complains of    Chief Complaint   Patient presents with     Diabetes       I have reviewed and updated the patient's Past Medical History, Social History, Family History and Medication List.    ALLERGIES  Venom-honey bee    Additional provider notes: This visit was converted to phone encounter due to COVID 19 outbreak.  Patient states she is doing well, no medication side effects from metformin.  It was restarted at last visit.  She is not checking her blood sugar, unable to locate her meter and testing supplies.  No acute fever or URI symptoms.  No known sick contact.  No hypoglycemic symptoms.  Last A1c was 9.7 in 2/2020.    Assessment/Plan:  1. Type 2 diabetes mellitus without complication, without long-term current use of insulin (H)  New meter and testing supplies sent.   Tolerating metformin a 50 mg well, increased the dose to thousand milligrams twice daily and labs in 2 weeks.  Future lab order placed.  Patient voiced understanding and my CMA will call the patient to schedule for lab.    - blood-glucose meter Misc; Check blood sugar once a day  Dispense: 1 each; Refill: 0  - blood glucose test strips; Use 1 each As Directed as needed. Dispense brand per patient's insurance at pharmacy discretion.  Dispense: 200 strip; Refill: 11  - " generic lancets; Use 1 each As Directed as needed. Check blood sugar once a day  Dispense: 200 each; Refill: 11  - metFORMIN (GLUCOPHAGE) 1000 MG tablet; Take 1 tablet (1,000 mg total) by mouth 2 (two) times a day with meals.  Dispense: 60 tablet; Refill: 5  - Comprehensive Metabolic Panel; Future      Phone call duration:  7 minutes      Dr. Gareth Kimble  4/3/2020 3:18 PM

## 2021-06-07 NOTE — TELEPHONE ENCOUNTER
Called patientleft message to called clinic back. To Prevent the spread of  Corona virus our providers have reviewed their scheduled of upcoming patient visits to prioritize care needs related to Covid-19 as well as to ensure the health, safety and wellbeing  of other patients we are being asked to reschedule office visit to virtual on 4/3/2020 @4:40pm  will be calling patient at this time ,unless is urgent please help reschedule patient. Thank you     Anisa Massey,

## 2021-06-11 NOTE — PROGRESS NOTES
Assessment: /    Plan:    1. Abdominal pain, generalized  HM1(CBC and Differential)    Urinalysis-UC if Indicated    Lipase    HM1 (CBC with Diff)    Culture, Urine   2. Type 2 diabetes mellitus without complication, without long-term current use of insulin (H)  Comprehensive Metabolic Panel    Microalbumin, Random Urine    Glycosylated Hemoglobin A1c   3. Mixed hyperlipidemia  Lipid [place] FASTING   4. Acute cystitis without hematuria  nitrofurantoin, macrocrystal-monohydrate, (MACROBID) 100 MG capsule       Colonoscopy in November.    This was a 31 minute visit with >50% of the time spent in face-to-face counseling and coordination of care regarding: abdominal pain, diabetes, cystitis.      Subjective:    HPI:  Jonathan Dominguez is a 44-year-old female presenting with abdominal pain.  It involves the right flank.  She takes omeprazole.  She feels OK now.  Food does not affect the pain.    She also has menstrual cramps.    She had CT of the abdomen in 2018.    Weight has decreased 11# since February.    She does not have a glucometer.      Review of Systems:  No fever, vomiting, diarrhea or melena.      Current Outpatient Medications   Medication Sig Dispense Refill     aspirin-acetaminophen-caffeine (EXCEDRIN MIGRAINE) 250-250-65 mg per tablet Take 1 tablet by mouth every 6 (six) hours as needed for pain.       atorvastatin (LIPITOR) 40 MG tablet Take 1 tablet (40 mg total) by mouth daily. 90 tablet 1     blood glucose test strips Use 1 each As Directed as needed. Dispense brand per patient's insurance at pharmacy discretion. 200 strip 11     blood-glucose meter Misc Check blood sugar once a day 1 each 0     generic lancets (FINGERSTIX LANCETS) Dispense brand per patient's insurance at pharmacy discretion. Test twice a day 200 each 11     generic lancets Use 1 each As Directed as needed. Check blood sugar once a day 200 each 11     hydrOXYzine (VISTARIL) 50 MG capsule Take 1 capsule (50 mg total) by mouth 3 (three)  times a day as needed (anxiety or insomnia). 30 capsule 0     metFORMIN (GLUCOPHAGE) 1000 MG tablet Take 1 tablet (1,000 mg total) by mouth 2 (two) times a day with meals. 60 tablet 5     omeprazole (PRILOSEC) 10 MG capsule Take 10 mg by mouth daily before breakfast.       nitrofurantoin, macrocrystal-monohydrate, (MACROBID) 100 MG capsule Take 1 capsule (100 mg total) by mouth 2 (two) times a day for 7 days. 14 capsule 0     No current facility-administered medications for this visit.          Objective:    Vitals:    09/23/20 1014   BP: 130/84   Pulse:    Resp:    Temp:    SpO2:        Gen:  NAD, VSS  Lungs:  normal  Heart:  normal  Abdomen:  No HSM, mass or tenderness  Back: no CVA tenderness    WBC 7.5  UA: nitrite positive  A1c 7.9      ADDITIONAL HISTORY SUMMARIZED (2): None.  DECISION TO OBTAIN EXTRA INFORMATION (1): None.   RADIOLOGY TESTS (1): None.  LABS (1): ordered.  MEDICINE TESTS (1): None.  INDEPENDENT REVIEW (2 each): None.     Total Data Points: 1

## 2021-06-11 NOTE — TELEPHONE ENCOUNTER
Medication Question or Clarification  Who is calling: Pharmacy  What medication are you calling about (include dose and sig)?: atorvastatin (LIPITOR) 40 MG tablet  Who prescribed the medication?: Gareth Kimble MD  What is your question/concern?: Pt requesting 90 day supply. Please advise.  Requested [facility]acy: [facility]  Okay to leave a detailed message?: Yes

## 2021-06-11 NOTE — PROGRESS NOTES
"ASSESMENT AND PLAN:  Diagnoses and all orders for this visit:    Severe menstrual cramps  -     US Pelvis With Transvaginal Non OB; Future; Expected date: 6/2/17  Advised to take ibuprofen 1-2 days before her periods.  Will schedule for pap/physical and discuss US results. Gyn referral if indicated.       SUBJECTIVE: Jonathan Dominguez is here to establish care and severe menstrual cramps.   She has 1 year of \"bad menstrual cramps\" described as labor-like pains. She did not really have menstrual cramping prior to 1 year ago. She notes yesterday she was cramping all day 2 days ago and went to the ER yesterday.  Is currently on her period, started yesterday.  Her periods are somewhat irregular lately but denied heavy flow or clots. She denies vaginal discharge or concern for STD. She usually has a slight fever and diarrhea with her periods. The patient does have 7 biological children, the youngest are twins and they are 6 years old.  No history of abdominal history.      No past medical history on file.  There is no problem list on file for this patient.      Allergies:  No Known Allergies    History   Smoking Status     Current Every Day Smoker   Smokeless Tobacco     Never Used       Review of systems otherwise negative except as listed in HPI.   History   Smoking Status     Current Every Day Smoker   Smokeless Tobacco     Never Used       OBJECTICE: /84 (Patient Site: Left Arm, Patient Position: Sitting, Cuff Size: Adult Regular)  Pulse 68  Temp 98.1  F (36.7  C) (Oral)   Resp 16  Ht 5' 5\" (1.651 m)  Wt 156 lb (70.8 kg)  LMP 06/01/2017  BMI 25.96 kg/m2    DATA REVIEWED:    Radiology Tests Summarized or Ordered (1):         GEN-alert,  in no apparent distress.  HEENT-mucous membranes are moist, neck is supple.  CV-regular rate and rhythm with no murmur.   RESP-lungs clear to auscultation .  ABDOMEN- Soft , No tenderness today.  SKIN-normal        [facility]   6/2/2017   "

## 2021-06-12 NOTE — PROGRESS NOTES
"ASSESSMENT and plan   1. Flank pain  Reproducible pain noted on both flanks.  The pain is continuous although the patient complains that it is like a spasm.  We reviewed all her recent lab tests in detail including the discharge summary.  Celebrex trial to be instituted side effects of medication discussed follow up with primary doctor in 2 weeks.  She understands if the pain increases or becomes more severe or change in pattern she needs to contact me at once.  - celecoxib (CELEBREX) 200 MG capsule; Take 1 capsule (200 mg total) by mouth 2 (two) times a day.  Dispense: 60 capsule; Refill: 2      *There are no Patient Instructions on file for this visit.    No orders of the defined types were placed in this encounter.    There are no discontinued medications.    No follow-ups on file.    CHIEF COMPLAINT:  Chief Complaint   Patient presents with     Abdominal Pain     Back Pain     Pain in rectum       HISTORY OF PRESENT ILLNESS:  Jonathan is a 44 y.o. female who is here for a follow-up of side pain and potential rectal pain.  She was seen in the ER earlier this week for this pain.  She reports that the pain started on 10/2/2020 after she been treated for a urinary tract infection.  She went in because there is a wave of pain radiating from her sides down to her rectum and when she was there before she could provide a voided urine specimen she felt the pain had gone away and she felt that \"and \"stupid\" examination and test results were explained to her and she reports that the symptoms are absent for approximately 2 days and then started again last night after dinner.  She states that the pain became severe enough that she thought she might need to go to the emergency room but came here instead.    She states that the pain is present on her sides and sometimes radiates to her rectum but not to the frontal portion of her abdomen.  Her appetite is been normal she notes no fever no chills no shortness of breath.  She states " "she does not have any vaginal discharge.  She does have loose stools because of her metformin use but states that she has not felt nauseated.    REVIEW OF SYSTEMS:   Musculoskeletal positive for bilateral flank pain which radiates to the SI joints bilaterally  10 point review of  All other systems are negative.    PFSH:    Social history reviewed  She has 7 children    TOBACCO USE:  Social History     Tobacco Use   Smoking Status Current Some Day Smoker   Smokeless Tobacco Never Used       VITALS:  Vitals:    10/06/20 1013   BP: 146/86   Pulse: 88   Resp: 18   Temp: 97.9  F (36.6  C)   TempSrc: Oral   Weight: 148 lb 9 oz (67.4 kg)   Height: 5' 1\" (1.549 m)     Wt Readings from Last 3 Encounters:   10/06/20 148 lb 9 oz (67.4 kg)   10/03/20 148 lb (67.1 kg)   09/23/20 146 lb 4 oz (66.3 kg)       PHYSICAL EXAM:  Interactive female sitting comfortably exam no acute distress  HEENT neck supple mucous members moist there is no lymph enlargement noted neck  Respiratory system clear to auscultation good breath sounds no wheeze no crackles  CVS regular rate and rhythm no murmurs rubs gallops appreciated  Abdomen soft there is no focal tenderness no hepatosplenomegaly bowel sounds are present  Musculoskeletal system tenderness elicited when I palpate the costovertebral angles bilaterally.  She has no tenderness of the buttocks.  Forward flexion extension of her lumbar spine produces no discomfort      DATA REVIEWED:  Additional History from Old Records Summarized (2):   Reviewed ER visit from ED staff including differential diagnosis with patient  Decision to Obtain Records (1): 0  Radiology Tests Summarized or Ordered (1): 1  CT scan of the abdomen showed no abnormal findings.  This was discussed with the patient pelvic ultrasound showed no abnormalities.  Labs Reviewed or Ordered (1): 1  Lab test discussed in detail including negative UA, CMP and hemogram  Medicine Test Summarized or Ordered (1): 0  Independent Review of " EKG or X-RAY(2 each): 0    The visit lasted a total of 23 minutes face to face with the patient. Over 50% of the time was spent counseling and educating the patient about   Musculoskeletal flank pain additional 7 minutes were spent documenting her total of 30 minutes    Data points 4.    MEDICATIONS:  Current Outpatient Medications   Medication Sig Dispense Refill     aspirin-acetaminophen-caffeine (EXCEDRIN MIGRAINE) 250-250-65 mg per tablet Take 1 tablet by mouth every 6 (six) hours as needed for pain.       atorvastatin (LIPITOR) 40 MG tablet Take 1 tablet (40 mg total) by mouth daily. 90 tablet 1     metFORMIN (GLUCOPHAGE) 1000 MG tablet Take 1 tablet (1,000 mg total) by mouth 2 (two) times a day with meals. 60 tablet 5     omeprazole (PRILOSEC) 10 MG capsule Take 10 mg by mouth daily before breakfast.       blood glucose test strips Use 1 each As Directed as needed. Dispense brand per patient's insurance at pharmacy discretion. 200 strip 11     blood-glucose meter Misc Check blood sugar once a day 1 each 0     celecoxib (CELEBREX) 200 MG capsule Take 1 capsule (200 mg total) by mouth 2 (two) times a day. 60 capsule 2     generic lancets (FINGERSTIX LANCETS) Dispense brand per patient's insurance at pharmacy discretion. Test twice a day 200 each 11     generic lancets Use 1 each As Directed as needed. Check blood sugar once a day 200 each 11     hydrOXYzine (VISTARIL) 50 MG capsule Take 1 capsule (50 mg total) by mouth 3 (three) times a day as needed (anxiety or insomnia). 30 capsule 0     No current facility-administered medications for this visit.      Marina CAMEJO

## 2021-06-13 NOTE — PROGRESS NOTES
ASSESSMENT/PLAN:    1. Strep pharyngitis  - Rapid Strep A Screen-Throat  - amoxicillin (AMOXIL) 500 MG capsule; Take 1 capsule (500 mg total) by mouth 2 (two) times a day for 10 days.  Dispense: 20 capsule; Refill: 0    2. Insomnia  - hydrOXYzine (VISTARIL) 50 MG capsule; Take 1 capsule (50 mg total) by mouth 3 (three) times a day as needed (anxiety or insomnia).  Dispense: 30 capsule; Refill: 0    3. Grief reaction  She continues to have severe symptoms of grief, worsened by physical symptoms of severe insomnia and chronic headaches.  She will remain off of work.  She will continue to seek supportive environment.  Her 's  is this week.   I am hoping Vistaril will help when she is feeling overwhelming anxiety as well.    4. Acute intractable tension-type headache  I am hoping as she recovers from her grief and gets better sleep with Vistaril above that her headaches will improve.   I am reluctant to give her much for headache prophylaxis, as those medications would have potential side effects of making her too sleepy (which would be risky because she takes care of many small children (or impair concentration.  Will hold off for now, but would consider in the future.    Of note, I had completed some [facility] paperwork for her last week, but she has not signed her part because it had been directly faxed to us.  She signed the forms today and then we fax them with all signatures.    ADDENDUM:  Will have her meet with a mental health therapist for further evaluation and treatment (referral made).    SUBJECTIVE:  Jonathan Dominguez is a 42 y.o. female here for follow-up on grief reaction, headaches, insomnia.  She also started to develop sore throat, nasal congestion, and bilateral ear pain (right more than left ) yesterday.  No fevers or chills.  She has been exhausted with her grief, headaches, insomnia and so is not sure if her symptoms are worse with the upper respiratory symptoms or not.  She takes Excedrin for  "her symptoms, and that helps a bit.    She continues to have poor sleep.  She lays in bed for hours and only falls asleep in the middle the night for a few hours.  She takes Tylenol PM sometimes and that helps for a couple of hours.  She continues to have great deal concentrating and completing tasks due to her grief, fatigue, and headaches.  She has had long-standing headaches for years, but they are worse recently.  She feels this is due to her poor sleep.    Of note, 1 of her children had been ill with a fever recently and her strep test had been negative.    The following portions of the patient's history were reviewed and updated as appropriate: allergies, current medications and problem list  Current Outpatient Prescriptions on File Prior to Visit   Medication Sig Dispense Refill     aspirin-acetaminophen-caffeine (EXCEDRIN MIGRAINE) 250-250-65 mg per tablet Take 1 tablet by mouth every 6 (six) hours as needed for pain.       No current facility-administered medications on file prior to visit.         History   Smoking Status     Current Every Day Smoker   Smokeless Tobacco     Never Used       OBJECTIVE:  :  /78 (Patient Site: Right Arm, Patient Position: Sitting, Cuff Size: Adult Regular)  Pulse 78  Temp 98.6  F (37  C) (Oral)   Resp 20  Ht 5' 0.4\" (1.534 m)  Wt 152 lb (68.9 kg)  LMP 10/19/2017  BMI 29.29 kg/m2    Gen:  A&A, NAD  HEENT: Bilateral ear canals clear, pearly gray tympanic membranes.  Oropharynx is erythematous without induration or exudate.    Neck:  supple, no sig LAD or thyromegally  CV:  HRRR, no M/R/G  Resp:  CTAB  Ext:  W&D, no edema    Lab results:    Results for orders placed or performed in visit on 10/31/17   Rapid Strep A Screen-Throat   Result Value Ref Range    Rapid Strep A Antigen Group A Strep detected (!) No Group A Strep detected, presumptive negative           "

## 2021-06-13 NOTE — PROGRESS NOTES
ASSESSMENT/PLAN:    1. Grief reaction, Acute intractable tension-type headache, Insomnia  Patient is having extreme symptoms of grief at this time.  Because of the baseline emotional state of sorrow and grief, as well as constant headaches and insomnia with very little sleep, I think it is impossible for her to go back to work at this time.  She could not be effective at her job due to her symptoms and is not safe to do her job at this time.    I recommended that she check with her employer on her options for FMLA.  I would support FMLA at this time due to her above symptoms.  Would recommend to be off at 10-11 weeks.  If she has dramatic improvement before then, is possible that we could send her back sooner.    We did discuss the options of expectant management, medications, psychotherapy.  I do not think it is appropriate to start any antidepressants at this time given that she is experiencing grief and not true depression.  However, this may be considered in the future.  We did discuss the possibility of using a medication to help her sleep.  She prefers to just try Tylenol PM for now.  Amitriptyline might be a good option for her given her headaches and she will let me know if she would like to try this.    I will plan to see her in a few weeks to recheck on her symptoms and come up with further treatment plans.      SUBJECTIVE:  Jonathan Dominguez is a 42 y.o. female here for depression/grief.    Patient's  unexpectedly killed himself on 10/15/2017.  Since that time, she has had extreme sorrow and distress.  She is feeling overwhelmed and unable to cope.  She is crying almost all the time and cannot seem to stop.  She cannot sleep even when she has time to sleep.  She is trying to keep busy by cleaning, but is not working to take her mind off of her situation.  She has young children at home that do not understand what is happening and she has to keep trying to explain, which makes her feel worse.  She is  having constant headache that is not relieved by Excedrin Migraine, which have been helpful to her in the past.  It is hard for her to eat and to keep up on drinking fluids.    She feels like she was doing well prior to her 's suicide.  Does not think she was depressed before this.    She is tried Tylenol PM for difficulty sleeping.  It seems like it might help for an hour or 2 but then she is back up.  She also has a lot of cultural rituals that she needs to attend to during the middle the night, sometimes even if she could sleep she is being awoken.    She tried to go back to work yesterday, but was too distraught and unable to concentrate and went home after a couple of hours.    She knows that her concentration is very poor.  She feels terrible because earlier today she accidentally drove by a school bus that it had flashing lights and she did not realize she should have stopped until she is already gone by.  She is worried that it could be dangerous for her to do things because of her severely compromised concentration and attention.      PHQ-2 Total Score: 6 (10/24/2017  2:00 PM)    PHQ-9 Total Score: 25 (10/24/2017  2:00 PM)   Little interest or pleasure in doing things: Nearly every day  Feeling down, depressed, or hopeless: Nearly every day  Trouble falling or staying asleep, or sleeping too much: Nearly every day  Feeling tired or having little energy: Nearly every day  Poor appetite or overeating: Nearly every day  Feeling bad about yourself - or that you are a failure or have let yourself or your family down: Nearly every day  Trouble concentrating on things, such as reading the newspaper or watching television: Nearly every day  Moving or speaking so slowly that other people could have noticed. Or the opposite - being so fidgety or restless that you have been moving around a lot more than usual: Nearly every day  Thoughts that you would be better off dead, or of hurting yourself in some way:  "Several days  PHQ-9 Total Score: 25  If you checked off any problems, how difficult have these problems made it for you to do your work, take care of things at home, or get along with other people?: Very difficult     The following portions of the patient's history were reviewed and updated as appropriate: allergies, current medications and problem list  Current Outpatient Prescriptions on File Prior to Visit   Medication Sig Dispense Refill     aspirin-acetaminophen-caffeine (EXCEDRIN MIGRAINE) 250-250-65 mg per tablet Take 1 tablet by mouth every 6 (six) hours as needed for pain.       No current facility-administered medications on file prior to visit.          History   Smoking Status     Current Every Day Smoker   Smokeless Tobacco     Never Used       OBJECTIVE:  :  /86 (Patient Site: Right Arm, Patient Position: Sitting, Cuff Size: Adult Regular)  Pulse 78  Temp 98.3  F (36.8  C) (Oral)   Resp 16  Ht 5' 0.2\" (1.529 m)  Wt 150 lb 8 oz (68.3 kg)  LMP 10/19/2017  BMI 29.2 kg/m2  Wt Readings from Last 3 Encounters:   10/24/17 150 lb 8 oz (68.3 kg)   06/02/17 156 lb (70.8 kg)   06/01/17 155 lb (70.3 kg)         Gen:  A&A, NAD.  She appears exhausted.  She is tearful at times.  Her eyes are puffy.  She communicates effectively.        "

## 2021-06-14 NOTE — PROGRESS NOTES
This is patient's first diagnostic intake session. Insufficient information was collected during this encounter to complete a full diagnostic assessment. Patient presented to session following a referral by Dr. Kimble. Patient presents reporting problems and symptoms including a strong grief reaction to the recent death of her  on 10/15/2017. Patient was not able to complete the intake form or inventories prior to today's appointment. The consent for service was reviewed with the patient. Patient verbally indicated understanding contents of the consent for service and provided signature on the form. The therapist will complete a thorough review of patient's medical record. Therapist will obtain a release of information for any community medical or mental health provider and/or service. This writer and patient will discuss patient treatment goals and initiate an individual treatment plan at follow-up encounter.

## 2021-06-14 NOTE — TELEPHONE ENCOUNTER
Called pt to follow up and scheduled pt for EDF with PCP tomorrow at 1220 pm.  Also scheduled establish physical for pt son (new pt to [facility]).  Thanks.

## 2021-06-14 NOTE — PROGRESS NOTES
The therapist has received voicemail messages from [email or web address] calling on behalf of [employer] company on the following dates: 12/7/17, 12/11/17, 12/13/17, 12/14/17, 12/15/17 and 12/18/17. As already indicated in a documentation note from 12/5/2017, the therapist informed the agent that request for medical records must be requested through [employer]. The therapist provided the agent with the appropriate contact information in order to request medical records during previous phone call encounters. However, the agent continued to call and leave a VM on the dates listed above. The therapist called the agent on 12/18/2017 and requested to speak with a supervisor through the company. The agent initially provided the therapist with the wrong number. The therapist called again and received a different number: [phone]. The therapist called to speak with a supervisor on 12/19/2017 at 3:10pm. The therapist spoke with a supervisor and explained the situation also providing the agent's name. The supervisor reported that documentation would be filed for the case and received the contact information for [employer] again in order to request medical records. No further action required for the therapist at this time.

## 2021-06-14 NOTE — PROGRESS NOTES
"Mental Health Visit Note    This is a dual signature report. My supervising clinician is BELLA Plata.    12/21/2017    Start time: 10:00am    Stop Time: 11:00am   Session # 1 (post DA)    Jonathan Dominguez is a 42 y.o. female is being seen today for    Chief Complaint   Patient presents with     MH Follow Up     Psychotherapy follow-up visit to address grief and loss     New symptoms or complaints: None    Functional Impairment:   Personal: 4  Family: 3  Work: 4  Social:3    Clinical assessment of mental status:   Grooming: Well groomed  Attire: Appropriate  Age: Appears Stated  Behavior Towards Examiner: Cooperative  Motor Activity: Within normal   Eye Contact: Appropriate  Mood: Sad  Affect: Congruent w/content of speech and Tearful  Speech/Language: Within normal  Attention: Within normal  Concentration: Brief  Thought Process: Within normal  Thought Content: Hallucinations: none reported  Delusions: none reported  Orientation: X 3  Memory: No Evidence of Impairment  Judgement: No Evidence of Impairment  Estimated Intelligence: Average  Demonstrated Insight: Adequate  Fund of Knowledge: adequate      Suicidal/Homicidal Ideation present: None Reported This Session    Patient's impression of their current status:   The patient reported that she was feeling okay and \"doing better.\" She shared that she still has hard days but is learning not to blame herself as much and is trying to forgive. She stated, \"this is life - stuff like this happens.\" The patient shared that she is focused on \"making baby steps\" every day and acknowledged that healing will take time.   The patient shared that she was \"trying not to cry today\" because she has been crying a lot. She acknowledged that crying does help her feel better emotionally but physically causes more headaches. She reported that she has been experiencing increased migraines. The therapist and patient processed how she might be able to continue balancing the need for " "emotional relief while managing increased migraines which impair functioning.   The patient discussed ways in which her grief is impacting her role as a parent. She stated, \"I don't want my kids to feel bad\" when they see her crying. The patient identified cultural influences and how Hmong parents' are responsible for their kids' feelings. Thus, she doesn't want her children to feel badly or be reminded of their dad because of her. The patient acknowledged that her youngest children are also scared that something may happen to her. She reported efforts to hide her sadness from her kids so they don't feel scared.   *The patient mentioned that the \"hardest thing is still how he passed.\"     Therapist impression of patients current state:   The patient presented on-time for a follow-up psychotherapy appointment. She appeared cooperative and open-minded throughout today's visit. The patient appeared receptive to therapist feedback and suggestions.   The therapist and patient discussed ways in which the patient could work to normalize the grieving process for and with her children. For instance, it will be important for the patient to model appropriate management of emotions while acknowledging the challenges of grief (as opposed to completely hiding her emotions or avoiding any discussion of the death with her children). The therapist suggested that the children receive additional support at school or in the community to help with the grieving process. That way, the patient would also be supported in attempting to manage the emotional impact the death of her  has had on the children especially while the patient is also grieving. The therapist attempted to help reduce stigma of the grief reaction and highlight ways in which the patient is appropriately navigating the stages. The therapist attempted to challenge the patient's avoidance patterns and instill hope for healing. The therapist supports the patient's " decision to return to work and believes this will be a positive step in returning to a healthy routine. The therapist will help the patient navigate any unforseen barriers that arise as she transitions back into her position. 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 The therapist provided psycho-education about the importance of sleep and will continue to encourage the patient to prioritize a healthy sleep routine as she returns to work.   The therapist will encourage the patient to participate in more grief work during future sessions.    Type of psychotherapeutic technique provided: Insight oriented, CBT and grief therapy    Progress toward short term goals:Progress as expected, as patient adequately engaged in the grieving process during today's session    Review of long term goals: Patient plans to return to work on 1/2/2018.    Diagnosis:   1. Adjustment disorder with mixed anxiety and depressed mood    2. Grief reaction      Plan and Follow up: Patient is scheduled to return for a follow-up session on 1/19/2018. Patient requested a return to work letter.     Discharge Criteria/Planning: Patient will continue with follow-up until therapy can be discontinued without return of signs and symptoms.    Carol Rosales 12/21/2017    Performed and documented by JUAN CARLOS Bustamante    I have read, discussed and reviewed the documentation as presented by JUAN CARLOS Bustamante LICSW    This note was created with help of Dragon dictation software. Grammatical / typing errors are not intentional and inherent to the software.

## 2021-06-14 NOTE — PROGRESS NOTES
Therapist has been contacted several times by [email or web address] on behalf of [employer] inquiring about patient's medical records. Therapist called agency on 12/5/2017 at 11:45am and informed agent that medical records must be requested through [employer]. Therapist provided phone and fax number. Agent shared that all medical records were needed in regards to a claim filed for short-term disability for the following dates: 10/1/2017 to present. Therapist reiterated that medical records must be requested through [employer] services. Agent unable to clarify if therapist needed to submit paperwork independently to assist with the process.

## 2021-06-14 NOTE — TELEPHONE ENCOUNTER
Wellness Screening Tool  Symptom Screening:  Do you have one of the following NEW symptoms:    Fever (subjective or >100.0)?  No    A new cough?  No    Shortness of breath?  No     Chills? No     New loss of taste or smell? No     Generalized body aches? No     New persistent headache? No     New sore throat? No     Nausea, vomiting, or diarrhea?  No    Within the past 2 weeks, have you been exposed to someone with a known positive illness below:    COVID-19 (known or suspected)?  No    Chicken pox?  No    Mealses?  No    Pertussis?  No    Patient notified of visitor policy- No visitors are allowed to accompany the patient at this time. Yes  Pt informed to wear a mask: Yes  Pt notified if they develop any symptoms listed above, prior to their appointment, they are to call the clinic directly at [phone] for further instructions.  Yes  Patient's appointment status: Patient will be seen in clinic as scheduled on 1/14

## 2021-06-14 NOTE — PROGRESS NOTES
ASSESMENT AND PLAN:  Diagnoses and all orders for this visit:    Left-sided chest pain  -     [facility]  Left-sided chest pain radiating to left arm.  Referred to [facility] clinic due to underlying medical conditions and continues symptoms after ED discharge.    Neck pain  -     cyclobenzaprine (FLEXERIL) 5 MG tablet; Take 1 tablet (5 mg total) by mouth 2 (two) times a day as needed for muscle spasms.  Dispense: 20 tablet; Refill: 0    Type 2 diabetes mellitus without complication, without long-term current use of insulin (H)  -     blood glucose test strips; Use 1 each As Directed as needed. Dispense brand per patient's insurance at pharmacy discretion.  Dispense: 200 strip; Refill: 11  -     blood-glucose meter Misc; Check blood sugar once a day  Dispense: 1 each; Refill: 0  -     generic lancets; Use 1 each As Directed as needed. Check blood sugar once a day  Dispense: 200 each; Refill: 11  Month meter and testing supplies sent.  She will start checking her fasting blood sugar daily.  Last A1c 7.9 in 9/2020. Recheck at next visit.    Mixed hyperlipidemia  Continue current medication.   in 9/20.  Recheck at next visit.    This transcription uses voice recognition software, which may contain typographical errors.      SUBJECTIVE: Jonathan Dominguez is a 45-year-old female with history of diabetes and hyperlipidemia here for ED follow-up.  She was seen in the ED on 1/5/2021, 3 days ago due to chest pain.  Cardiac work-up was negative.  She was recommended to follow-up with PCP for outpatient stress test.  She is still having constant left-sided chest pain occasionally radiates to her left arm.  No palpitations or shortness of breath.  She also has history of heartburn.  She is worried that she might have heart attack.    She has not been able to check her blood sugar lately.  She cannot find her old meter.  Prescription sent was not covered by her insurance.  She goes to her new  "pharmacy now.  States she takes all her medications as prescribed.    She recovered from Covid in October.    Past Medical History:   Diagnosis Date     GERD (gastroesophageal reflux disease)      Gestational diabetes      Patient Active Problem List   Diagnosis     Hepatitis B carrier (H)     Tobacco use     Overweight (BMI 25.0-29.9)     Grief reaction     Family history of colon cancer in 1st degree relative, age 46     History of gestational diabetes     Type 2 diabetes mellitus without complication (H)     Migraine without status migrainosus, not intractable, unspecified migraine type     Heartburn     Mixed hyperlipidemia       Allergies:    Allergies   Allergen Reactions     Venom-Honey Bee Anaphylaxis       Social History     Tobacco Use   Smoking Status Current Some Day Smoker   Smokeless Tobacco Never Used       Review of systems otherwise negative except as listed in HPI.   Social History     Tobacco Use   Smoking Status Current Some Day Smoker   Smokeless Tobacco Never Used       OBJECTICE: /88   Pulse 80   Temp 98.4  F (36.9  C) (Oral)   Resp 18   Ht 5' 1\" (1.549 m)   Wt 150 lb 2 oz (68.1 kg)   LMP 12/16/2020   BMI 28.37 kg/m      DATA REVIEWED:  Additional History from Old Records Summarized (2):   Radiology Tests Summarized or Ordered (1): CTA chest abdomen pelvis done on 1/5/2021 was unremarkable.  Labs Reviewed or Ordered (1):       GEN-alert,  in no apparent distress.  HEENT-neck is supple.  CV-regular rate and rhythm with no murmur.   RESP-lungs clear to auscultation .  ABDOMEN- Soft , not tender.  Mild epigastric tenderness.  EXTREM- No edema.  SKIN-normal        Gareth Kimble   1/8/2021   "

## 2021-06-14 NOTE — TELEPHONE ENCOUNTER
"Chest pains  6-7/10  Constant    Seen yesterday in ED    Pain has not moved to the left, also has a \"numb aching\" in her left arm    No shortness of breath     Triaged to a disposition of Go to ED     COVID 19 Nurse Triage Plan/Patient Instructions    Please be aware that novel coronavirus (COVID-19) may be circulating in the community. If you develop symptoms such as fever, cough, or SOB or if you have concerns about the presence of another infection including coronavirus (COVID-19), please contact your health care provider or visit [email or web address].     Disposition/Instructions    ED Visit recommended. Follow protocol based instructions.      Bring Your Own Device:  Please also bring your smart device(s) (smart phones, tablets, laptops) and their charging cables for your personal use and to communicate with your care team during your visit.      Thank you for taking steps to prevent the spread of this virus.  o Limit your contact with others.  o Wear a simple mask to cover your cough.  o Wash your hands well and often.    Resources    [facility]: About COVID-19: [email or web address]    CDC: What to Do If You're Sick: www.cdc.gov/coronavirus/2019-ncov/about/steps-when-sick.html    CDC: Ending Home Isolation: www.cdc.gov/coronavirus/2019-ncov/hcp/disposition-in-home-patients.html     CDC: Caring for Someone: www.cdc.gov/coronavirus/2019-ncov/if-you-are-sick/care-for-someone.html     [facility]: Interim Guidance for Hospital Discharge to Home: www.health.[facility].[place]/diseases/coronavirus/hcp/hospdischarge.pdf    [facility] clinical trials (COVID-19 research studies): [email or web address].[facility]/umn-clinical-trials     Below are the COVID-19 hotlines at the [place] Department of [facility] ([facility]). Interpreters are available.   o For health questions: Call 740-654-3183 or 1-699.258.1341 (7 a.m. to 7 p.m.)  o For questions about schools and childcare: Call 892-307-5721 or 1-955.644.1196 (7 a.m. to 7 p.m.) " "    Reason for Disposition    Pain also present in shoulder(s) or arm(s) or jaw  (Exception: pain is clearly made worse by movement)    Additional Information    Negative: Severe difficulty breathing (e.g., struggling for each breath, speaks in single words)    Negative: Difficult to awaken or acting confused (e.g., disoriented, slurred speech)    Negative: Shock suspected (e.g., cold/pale/clammy skin, too weak to stand, low BP, rapid pulse)    Negative: [1] Chest pain lasts > 5 minutes AND [2] history of heart disease  (i.e., heart attack, bypass surgery, angina, angioplasty, CHF; not just a heart murmur)    Negative: [1] Chest pain lasts > 5 minutes AND [2] described as crushing, pressure-like, or heavy    Negative: [1] Chest pain lasts > 5 minutes AND [2] age > 50    Negative: [1] Chest pain lasts > 5 minutes AND [2] age > 30 AND [3] at least one cardiac risk factor (i.e., hypertension, diabetes, obesity, smoker or strong family history of heart disease)    Negative: [1] Chest pain lasts > 5 minutes AND [2] not relieved with nitroglycerin    Negative: Passed out (i.e., lost consciousness, collapsed and was not responding)    Negative: Heart beating < 50 beats per minute OR > 140 beats per minute    Negative: Visible sweat on face or sweat dripping down face    Negative: Sounds like a life-threatening emergency to the triager    Negative: Followed a chest injury    Negative: SEVERE chest pain    Negative: [1] Intermittent  chest pain or \"angina\" AND [2] increasing in severity or frequency  (Exception: pains lasting a few seconds)    Protocols used: CHEST PAIN-A-    Michelle Naylor RN Triage Nurse Advisor    "

## 2021-06-14 NOTE — PROGRESS NOTES
Assessment/Plan:     Problem List Items Addressed This Visit     Hepatitis B carrier     I discovered this dx in outside records after she left.  Will see if we can add on monitoring orders.         Relevant Orders    Hepatitis B DNA Quantitative Real-Time PCR(HBQNT)($$$)    Tobacco use     Pre-contemplative to quiting.  Very high stress time in her life currently, but could pursue when more socially stable.         Overweight (BMI 25.0-29.9)     Weight up and down recently with grief.  As mental health improving, would strongly encourage weight loss.  Wt Readings from Last 3 Encounters:   12/15/17 148 lb (67.1 kg)   10/31/17 152 lb (68.9 kg)   10/24/17 150 lb 8 oz (68.3 kg)             Grief reaction     Severe grief reaction/ +/- adjustment disorder.  Improving with psychotherapy and time.  Continue to monitor carefully.         Family history of colon cancer in 1st degree relative, age 46     Father had colon cancer at a young age and  from it.  Last colonoscopy for this patient was .  Colonoscopy every 5 years.           History of gestational diabetes     A1C actually came back in diabetic range 6.9, fasting blood sugar in pre-diabetic range (119).  Would advise starting with lifestyle modification, could certainly add metformin now if pt wants, or hold off to response to lifestyle.         Relevant Orders    Glycosylated Hemoglobin A1c (Completed)    Comprehensive Metabolic Panel (Completed)      Other Visit Diagnoses     Routine general medical examination at a health care facility    -  Primary    Relevant Orders    Lipid Cascade (Completed)    Screening for cervical cancer        Found results from [facility]; last Pap Smear 9/11/15; next due .    Dysmenorrhea        No recurrence in last several months. Does have small uterine fibroid, but need not pursue therapy given asymptomatic.    Relevant Orders    HM2(CBC w/o Differential) (Completed)    Insomnia        Improving with improving  grief.    Relevant Orders    Thyroid [place]            Health Maintenance   Topic Date Due     ADVANCE DIRECTIVES DISCUSSED WITH PATIENT  10/07/1994     INFLUENZA VACCINE RULE BASED (1) 2017     TD 18+ HE  2019     PAP SMEAR  2020     COLONOSCOPY  2020     TDAP ADULT ONE TIME DOSE  Completed            Subjective:      Jonathan Dominguez is a 42 y.o. female who presents for an annual exam. The patient is not sexually active. The patient participates in regular exercise: no. The patient reports that there is not domestic violence in her life.     Healthy Habits:   Regular Exercise: No  Sunscreen Use: No  Healthy Diet: Yes  Dental Visits Regularly: Yes  Seat Belt: Yes  Sexually active: No  Self Breast Exam Monthly:Yes  Hemoccults: N/A  Flex Sig: N/A  Colonoscopy: N/A  Lipid Profile: No  Glucose Screen: 10/24/14  Prevention of Osteoporosis: N/A  Last Dexa: N/A  Guns at Home:  No      Immunization History   Administered Date(s) Administered     Influenza, inj, historic,unspecified 10/12/2011     Tdap 2009     Immunization status: up to date and documented.    No exam data present    Gynecologic History  Patient's last menstrual period was 2017 (approximate).  Contraception: none  Last Pap:   Last mammogram: n/a    Obstetric History       T5      L7     SAB2   TAB0   Ectopic0   Multiple1   Live Births7       # Outcome Date GA Lbr [place]/2nd Weight Sex Delivery Anes PTL Lv   8A          EUGENE   8B          EUGENE   7 SAB  4w0d          6 SAB  14w0d          5 Term         EUGENE   4 Term         EUGENE   3 Term         EUGENE   2 Term         EUGENE   1 Term         EUGENE              Current Outpatient Prescriptions   Medication Sig Dispense Refill     aspirin-acetaminophen-caffeine (EXCEDRIN MIGRAINE) 250-250-65 mg per tablet Take 1 tablet by mouth every 6 (six) hours as needed for pain.       hydrOXYzine (VISTARIL) 50 MG capsule Take 1 capsule (50 mg total) by mouth 3 (three)  "times a day as needed (anxiety or insomnia). 30 capsule 0     No current facility-administered medications for this visit.      Past Medical History:   Diagnosis Date     Gestational diabetes      Past Surgical History:   Procedure Laterality Date     DILATION AND CURETTAGE OF UTERUS      following miscarriage     Venom-honey bee  Family History   Problem Relation Age of Onset     Heart attack Mother      Diabetes Mother      Stroke Mother      Colon cancer Father      Dx age 46     Diabetes Sister      Diabetes Brother      Diabetes Sister      No Medical Problems Brother      No Medical Problems Brother      Diabetes Son      Brain cancer Niece      DIPG     Social History     Social History     Marital status: Single     Spouse name: N/A     Number of children: N/A     Years of education: N/A     Occupational History     Not on file.     Social History Main Topics     Smoking status: Current Some Day Smoker     Smokeless tobacco: Never Used     Alcohol use No     Drug use: No     Sexual activity: Not Currently     Birth control/ protection: None     Other Topics Concern     Not on file     Social History Narrative    6/1/2017 - Seven biological children, 2 step-children, 9 children total.       Review of Systems  Review of Systems     Complete review of systems is negative unless previously stated.       Objective:         Physical  Physical Exam         Vitals:    12/15/17 1339   BP: 126/74   Pulse: 64   Resp: 20   Temp: 98.5  F (36.9  C)   TempSrc: Oral   Weight: 148 lb (67.1 kg)   Height: 5' 0.4\" (1.534 m)     Body mass index is 28.52 kg/(m^2).    Physical Exam:  General Appearance: Alert, cooperative, no distress, appears stated age  Head: Normocephalic, without obvious abnormality, atraumatic  Eyes: PERRL, conjunctiva/corneas clear, EOM's intact  Ears: Normal TM's and external ear canals, both ears  Throat: Lips, mucosa, and tongue normal; teeth and gums normal  Neck: Supple, symmetrical, trachea midline, no " adenopathy;  thyroid: not enlarged, symmetric, no tenderness/mass/nodules  Back: Symmetric, no curvature, ROM normal, no CVA tenderness  Lungs: Clear to auscultation bilaterally, respirations unlabored  Breasts: No breast masses, tenderness, asymmetry, or nipple discharge.  Heart: Regular rate and rhythm, S1 and S2 normal, no murmur, rub, or gallop,   Abdomen: Soft, non-tender, no masses, no organomegaly  Pelvic:Declined  Extremities: Extremities normal, atraumatic, no cyanosis or edema  Skin: Skin color, texture, turgor normal, no rashes or lesions  Lymph nodes: Cervical and axillary nodes normal  Neurologic: Normal patellar DTR's, normal gait      Results for orders placed or performed in visit on 12/15/17   Glycosylated Hemoglobin A1c   Result Value Ref Range    Hemoglobin A1c 6.9 (H) 3.5 - 6.0 %   Lipid Cascade   Result Value Ref Range    Cholesterol 232 (H) <=199 mg/dL    Triglycerides 105 <=149 mg/dL    HDL Cholesterol 37 (L) >=50 mg/dL    LDL Calculated 174 (H) <=129 mg/dL    Patient Fasting > 8hrs? Yes    Comprehensive Metabolic Panel   Result Value Ref Range    Sodium 140 136 - 145 mmol/L    Potassium 3.9 3.5 - 5.0 mmol/L    Chloride 106 98 - 107 mmol/L    CO2 26 22 - 31 mmol/L    Anion Gap, Calculation 8 5 - 18 mmol/L    Glucose 119 70 - 125 mg/dL    BUN 11 8 - 22 mg/dL    Creatinine 0.65 0.60 - 1.10 mg/dL    GFR MDRD Af Amer >60 >60 mL/min/1.73m2    GFR MDRD Non Af Amer >60 >60 mL/min/1.73m2    Bilirubin, Total 0.7 0.0 - 1.0 mg/dL    Calcium 9.4 8.5 - 10.5 mg/dL    Protein, Total 7.2 6.0 - 8.0 g/dL    Albumin 3.5 3.5 - 5.0 g/dL    Alkaline Phosphatase 72 45 - 120 U/L    AST 14 0 - 40 U/L    ALT 29 0 - 45 U/L   HM2(CBC w/o Differential)   Result Value Ref Range    WBC 9.1 4.0 - 11.0 thou/uL    RBC 4.51 3.80 - 5.40 mill/uL    Hemoglobin 13.5 12.0 - 16.0 g/dL    Hematocrit 40.2 35.0 - 47.0 %    MCV 89 80 - 100 fL    MCH 30.0 27.0 - 34.0 pg    MCHC 33.6 32.0 - 36.0 g/dL    RDW 11.5 11.0 - 14.5 %    Platelets  269 140 - 440 thou/uL    MPV 7.7 7.0 - 10.0 fL

## 2021-06-14 NOTE — PROGRESS NOTES
Consultation - [facility]  Jonathan Dominguez,  1975, MRN [id number]    PCP: Gareth Schwartz MD, [phone]    Assessment and Plan: Chest pain.  Symptom complex may be consistent with angina and some characters but others as seems atypical.  Its positional character and prolonged persistent duration does not suggest an anginal pattern.  Another circumstance of the character of the pain could suggest angina she has multiple risk factors.  Given recent Covid infection consideration would be whether she may have sustained pericarditis that sharp component of the pain positional character association with laughing and changing position may suggest pericarditic origin.  Recommendations: I think we need to pursue evaluation for multiple causes.  CT angiogram to discern whether or not the coronary disease is present.  Would obtain serologic measures to look for a pericarditis.  Cardiac echo and obtain ECG today.    Chief Complaint: Chest pain syndrome    HPI:  We have been requested by Dr schwartz To evaluate Jonathan Dominguez for consultation who is a  45 y.o. year old female for above chief complaint.  Hx .  45-year-old woman with no previous heart disease.  She recently had Covid infection.  In the last several weeks that she has noted a new onset of the chest discomfort.  She describes it as both a dull and sharp pain is in the mid center of the chest sometimes extending up into the neck.  The pain can be sharp on occasion.  She notes that when breathing heavily or laughing the pain intensifies it seems to have been worse when she lay down.  The pain was constant it was not intermittent it did not come and go and was persistently present at all times.  At 1 point became very uncomfortable she came to the ER for evaluation was assessed and subsequently released.  ECG did show nonspecific change.  Since that time the pain has progressively improved.  She states today that there is no pain.  She thinks maybe when she was  exerting herself the pain became more prominent.  She has a very distant history of a syncopal event but no recent history of syncope no palpitations regular heartbeats dyspnea thought any PND peripheral edema.  She has history of hypercholesterolemia on therapy.  She is history of diabetes on therapy.  No history of hypertension although on occasion and under stress blood pressure is elevated.  His blood pressure is normal today.  History of smoking.  Family history of heart disease with mother with MI in her 50s      Current Outpatient Medications:      aspirin-acetaminophen-caffeine (EXCEDRIN MIGRAINE) 250-250-65 mg per tablet, Take 1 tablet by mouth every 6 (six) hours as needed for pain., Disp: , Rfl:      atorvastatin (LIPITOR) 40 MG tablet, Take 1 tablet (40 mg total) by mouth daily., Disp: 90 tablet, Rfl: 1     blood glucose test strips, Use 1 each As Directed as needed. Dispense brand per patient's insurance at pharmacy discretion., Disp: 200 strip, Rfl: 11     blood-glucose meter Misc, Check blood sugar once a day, Disp: 1 each, Rfl: 0     celecoxib (CELEBREX) 200 MG capsule, Take 1 capsule (200 mg total) by mouth 2 (two) times a day., Disp: 60 capsule, Rfl: 2     cyclobenzaprine (FLEXERIL) 5 MG tablet, Take 1 tablet (5 mg total) by mouth 2 (two) times a day as needed for muscle spasms., Disp: 20 tablet, Rfl: 0     generic lancets (FINGERSTIX LANCETS), Dispense brand per patient's insurance at pharmacy discretion. Test twice a day, Disp: 200 each, Rfl: 11     generic lancets, Use 1 each As Directed as needed. Check blood sugar once a day, Disp: 200 each, Rfl: 11     hydrOXYzine (VISTARIL) 50 MG capsule, Take 1 capsule (50 mg total) by mouth 3 (three) times a day as needed (anxiety or insomnia)., Disp: 30 capsule, Rfl: 0     metFORMIN (GLUCOPHAGE) 1000 MG tablet, Take 1 tablet (1,000 mg total) by mouth 2 (two) times a day with meals., Disp: 60 tablet, Rfl: 5     omeprazole (PRILOSEC) 10 MG capsule, Take 10  mg by mouth daily before breakfast., Disp: , Rfl:   Medical History  Active Ambulatory (Non-Hospital) Problems    Diagnosis     Mixed hyperlipidemia     Migraine without status migrainosus, not intractable, unspecified migraine type     Heartburn     Type 2 diabetes mellitus without complication (H)     Tobacco use     Overweight (BMI 25.0-29.9)     Grief reaction     Family history of colon cancer in 1st degree relative, age 46     History of gestational diabetes     Hepatitis B carrier (H)     Past Medical History:   Diagnosis Date     GERD (gastroesophageal reflux disease)      Gestational diabetes        Surgical History  She  has a past surgical history that includes Dilation and curettage of uterus.    Social History  Reviewed, and she  reports that she has been smoking. She has never used smokeless tobacco. She reports that she does not drink alcohol or use drugs.  Smoking status reviewed.  Social history othrwise not contributory to HPI.  Allergies  Allergies   Allergen Reactions     Venom-Honey Bee Anaphylaxis       Family History  Reviewed, and family history includes Brain cancer in her niece; Colon cancer in her father; Diabetes in her brother, mother, sister, sister, and son; Heart attack in her mother; No Medical Problems in her brother and brother; Stroke in her mother.  Extended Emergency Contact Information  Primary Emergency Contact: Wang Bernal  Address: [address] [place] [address] [place]  Home Phone: [phone]  Relation: Child  Secondary Emergency Contact: Sada Dominguez   [facility]  Home Phone: [phone]  Relation: Sister-In-Law  Family history otherwise negative or not conributory to HPI.    Psychosocial Needs  Social History     Social History Narrative    6/1/2017 - Seven biological children, 2 step-children, 9 children total.     Additional psychosocial needs reviewed per nursing assessment.    Prior to Admission Medications  (Not  in a hospital admission)      Review of Systems:  Pertinent items are noted in HPI.  A 12 point comprehensive review of systems was negative except as noted.  Review of systems is negative except for HPI  Physical Exam:  There were no vitals filed for this visit.  Head and neck without focal cranial neurologic defects.  JVD not distended.  Carotid upstroke normal without bruit.  External eye exam normal without icterus.  External ear exam normal.  Neck without cervical lymphadenopathy or thyromegaly.  Cardiac: S1-S2 distinct regular without S3-S4 rub or click no murmur evident  Lungs: Clear in all fields  Abdomen with normal bowel tones.  Skin without rash, ecchymosis, lesions.  Neuromuscular tone normal.  Peripheral pulse intact and equal.  Joints without swelling or erythema.    Pertinent Labs  Lab Results: personally reviewed.   Lab Results   Component Value Date     01/05/2021    K 3.5 01/05/2021     01/05/2021    CO2 26 01/05/2021    BUN 20 01/05/2021    CREATININE 0.67 01/05/2021    CALCIUM 8.7 01/05/2021     Lab Results   Component Value Date    TROPONINI <0.01 01/05/2021     Lab Results   Component Value Date    WBC 8.0 01/05/2021    WBC 8.5 10/24/2014    HGB 12.1 01/05/2021    HCT 37.8 01/05/2021    MCV 85 01/05/2021     01/05/2021     Lab Results   Component Value Date    CHOL 198 09/23/2020    TRIG 119 09/23/2020    HDL 44 (L) 09/23/2020       Pertinent Radiology  Radiology Results: See Report  EKG Results: personally reviewed.  and See Report       Current Outpatient Medications:      aspirin-acetaminophen-caffeine (EXCEDRIN MIGRAINE) 250-250-65 mg per tablet, Take 1 tablet by mouth every 6 (six) hours as needed for pain., Disp: , Rfl:      atorvastatin (LIPITOR) 40 MG tablet, Take 1 tablet (40 mg total) by mouth daily., Disp: 90 tablet, Rfl: 1     blood glucose test strips, Use 1 each As Directed as needed. Dispense brand per patient's insurance at pharmacy discretion., Disp: 200  strip, Rfl: 11     blood-glucose meter Misc, Check blood sugar once a day, Disp: 1 each, Rfl: 0     celecoxib (CELEBREX) 200 MG capsule, Take 1 capsule (200 mg total) by mouth 2 (two) times a day., Disp: 60 capsule, Rfl: 2     cyclobenzaprine (FLEXERIL) 5 MG tablet, Take 1 tablet (5 mg total) by mouth 2 (two) times a day as needed for muscle spasms., Disp: 20 tablet, Rfl: 0     generic lancets (FINGERSTIX LANCETS), Dispense brand per patient's insurance at pharmacy discretion. Test twice a day, Disp: 200 each, Rfl: 11     generic lancets, Use 1 each As Directed as needed. Check blood sugar once a day, Disp: 200 each, Rfl: 11     hydrOXYzine (VISTARIL) 50 MG capsule, Take 1 capsule (50 mg total) by mouth 3 (three) times a day as needed (anxiety or insomnia)., Disp: 30 capsule, Rfl: 0     metFORMIN (GLUCOPHAGE) 1000 MG tablet, Take 1 tablet (1,000 mg total) by mouth 2 (two) times a day with meals., Disp: 60 tablet, Rfl: 5     omeprazole (PRILOSEC) 10 MG capsule, Take 10 mg by mouth daily before breakfast., Disp: , Rfl:

## 2021-06-14 NOTE — PROGRESS NOTES
"Brief Diagnostic Assessment    This is a dual signature report. My supervising clinician is BELLA Plata.    Patient Name: Jonathan Dominguez  Age:  42 y.o.    1975    Date: 2017  Start Time: 4:00pm  Stop Time: 5:10pm    Previous encounter date on 2017; therapist unable to complete assessment during first encounter.    Referring Provider:   Dr. Shyanne Simmons Present:   Therapist and Patient    Patient s expectation for treatment:    Patient is willing to participate in psychotherapy in order to work through stages of grief. She stated, \"I don't want to bug others or stress them out.\" The patient agreed that she would benefit from a safe place in which to process difficult thoughts and feelings following the death of her .     *The therapist and patient agreed upon the following expectation for treatment: managing and processing a severe grief reaction after tragic death of  in 2017.     Recipient's description of symptoms (including reason for referral):    The patient was referred for psychotherapy after her  committed suicide in 2017. The patient is experiencing a severe grief reaction.   She endorsed the following physical problems: dizziness, constipation, nausea, vomiting, swallow problems, blurred vision, headaches, numbness, skin rash, inability to sleep, decreased energy, and decreased appetite. The patient endorsed the following social problems: distrust of others, decreased social activity and loss of interest in activities. The patient described experiencing the following cognitive problems which are impairing functioning: forgetfulness, distractibility, racing thoughts, disordered thinking, poor attention and procrastination. The patient described severe difficulties managing underlying emotional problems such as emptiness, depressed mood, mood swings and " feelings of guilt.       Mental Status Evaluation:  Grooming: Well groomed  Attire: Appropriate  Age: Appears Stated  Behavior Towards Examiner: Cooperative  Motor Activity: Within normal   Eye Contact: Appropriate  Mood: Sad  Affect: Congruent w/content of speech, Tearful and Depressed  Speech/Language: Within normal  Attention: Distractible  Concentration: Brief  Thought Process: Within normal  Thought Content: no hallucinations reportedno delusions reported  Orientation: X 3No Evidence of Impairment  Memory: No Evidence of Impairment  Judgement: No Evidence of Impairment  Estimated Intelligence: Above Average  Demonstrated Insight: Adequate  Fund of Knowledge: adequate    Current living situation (Household members, housing status, stability, multiple moves, potential eviction):  The patient currently lives with her children, ages 22, 18, 16 and 7 y/o twin boys.     The patient noted that her 's parents decided to take away her step-children after his death. The patient had reportedly lived with the step-children since they were 5 and 7 years old (for approximately 8 years total). The patient shared how the decision to remove the step-children was influenced by [facility] cultural values regarding biological family members. She identified their separation as another significant loss but described feeling powerless about her in-laws' decision to remove the step-children from the home.      Basic needs status including economic status:  The patient denied any problems meeting basic needs. Although, she did report concerns about her ability to work at this time which would result in major financials stress.    Education level:  The patient graduated from high school in [place].    Employment status:  The patient was employed for 16 years. She is currently applying for short-term disability after the death of her .     Significant personal relationships (including recipient's evaluation of relationship  "quality):  The patient described being extremely close with her children. She views them as a major source of support. The patient also has many family members in the [place] area. The patient noted that after the death of her , she is isolating more and has decreased social activity. She shared that her children are very supportive of her participating in therapy.     The patient was  for 19 years to 1st  and had 3 children, now ages 22, 18 and 16. She was \"culturally\"  to her 2nd  for 8 years - they were not legally . She had twin boys with her 2nd , now age 6. Her 2nd  reportedly had children from his 1st marriage, which have now been removed from the patient's home in order to live with their biological grandparents because the patient was not legally  to their father.     Strengths and resources (including extent and quality of social networks):  The patient appeared very-opened minded and cooperative during initial encounters with this therapist. The patient appeared willing and motivated to participate in the therapeutic process. Rapport was easily established and the patient appeared insightful regarding underlying problems to address in therapy.     Belief system:  The patient identifies with traditional [facility] values and Shamanism.     Contextual non-personal factors contributing to the recipient's presenting concerns:  The patient has received some negative backlash due to traditional [facility] values and misunderstandings about the grieving process. The reactions from family members and friends has been negative due to cultural influences.     The obvious contextual non-personal factor contributing to the recipient's presenting concerns is the recent death of her  in October 2017.    General physical health and relationship to recipient's culture:  The patient is generally healthy with no prior history of mental health treatment. The " "patient shared that she has been influenced by traditional [facility] cultural values \"not taking mental health seriously.\"     Current medications:  Current Outpatient Prescriptions   Medication Sig Dispense Refill     aspirin-acetaminophen-caffeine (EXCEDRIN MIGRAINE) 250-250-65 mg per tablet Take 1 tablet by mouth every 6 (six) hours as needed for pain.       hydrOXYzine (VISTARIL) 50 MG capsule Take 1 capsule (50 mg total) by mouth 3 (three) times a day as needed (anxiety or insomnia). 30 capsule 0     No current facility-administered medications for this visit.        Substance use, abuse, or dependency:  No substance use, abuse or dependency reported at intake.    Medical History:  Patient Active Problem List    Diagnosis Date Noted     Hepatitis B carrier 2010     No extensive medical history provided at intake. No major medical concerns.       The patient's medical visit with Dr. Ybarra on 10/31/2017 revealed the following information:  Grief reaction  She continues to have severe symptoms of grief, worsened by physical symptoms of severe insomnia and chronic headaches.  She will remain off of work.  She will continue to seek supportive environment.  Her 's  is this week.   I am hoping Vistaril will help when she is feeling overwhelming anxiety as well.   Acute intractable tension-type headache  I am hoping as she recovers from her grief and gets better sleep with Vistaril above that her headaches will improve.   I am reluctant to give her much for headache prophylaxis, as those medications would have potential side effects of making her too sleepy (which would be risky because she takes care of many small children (or impair concentration.  Will hold off for now, but would consider in the future.     Of note, I had completed some [facility] paperwork for her last week, but she has not signed her part because it had been directly faxed to us.  She signed the forms today and then we fax them with " "all signatures.     ADDENDUM:  Will have her meet with a mental health therapist for further evaluation and treatment (referral made).    Other standardized screening instruments:  Patient denied any suicidal ideation - a PANSI will be completed during a future session.    WHODAS 2.0 12-item version: 33      Scores presented in qualifiers to represent level of disability.  MODERATE Problem (medium, fair, ...) 25-49%    H1= 30  H2= 10  H3= 30  Answers are directly correlated with emotional response to 's death.      Clinical summary that explains the provisional diagnosis:  The patient is a 42 year old Hmong female. She did not require the assistance of a professional , as she is fluent in English. She was born in [facility] but moved to the [place] around age 3. She has reportedly assimilated to American culture but still identifies with traditional ong values.   The patient was referred to participate in psychotherapy in order to address her grief reaction to the tragic death of her  in October 2017. The patient has no prior mental health history and has never participated in psychotherapy before.  The patient is experiencing severe impairments to functioning following the loss of her . She is currently unable to work due to cognitive, behavioral and emotional problems for which she is currently seeking help by participating in psychotherapy services. The patient exhibited an ability to identify sources of stress related to the loss of her . She was able to adequately express her thoughts and feelings despite being overwhelmed with grief. The patient will require much time to gradually work through the stages of grief. At this time, she is still numb and shocked by his sudden and tragic death. The details of his death were traumatic, as he was found hanging in the garage. The patient is grappling with thoughts such as, \"I wish I had seen it coming\" and \"why did he do this?\" The " "patient is attempting to manage skepticism and judgment from members of the community, as death by suicide raises many questions and misunderstandings. The patient noted that she is isolating more from others because she is unable to answer questions about his death. The patient was very tearful throughout both encounters and stated, \"I just miss him so much.\" She stated, \"It hurts more when you just can't understand why this happened.\" The 's death was very recent (October 2017) and her first encounter with this writer was on 11/14/2017. Thus, the patient is still in the very beginning stages of the grieving process. Her prognosis is unclear in regards to her capacity to return to work. She expressed an interest in obtaining short-term disability while she participates in psychotherapy to manage her grief reaction. The therapist will assist the patient in developing skills which will eventually allow her to return to work. The therapist plans to help the patient navigate this process.    Long term goals will include:  -Accept the loss and return to a stable level of functioning  -Express unresolved emotions regarding losses  -Display an understanding of the grief process and how this process may exacerbate mental illness symptoms  -Develop an understanding of how avoidance of the grief process may affect functioning in many areas  -Develop alternative diversions and other coping mechanisms that are related to loss issues  -Redevelop a supportive social system     At this time, the patient meets criteria for an Adjustment Disorder with mixed anxiety and depression. The therapist will continue to assess for underlying mental health symptoms which have been exacerbated by the death of her . However, the patient has no mental health history so presenting problems are directly correlated with the 's death. Criteria for Bereavement will also be evaluated during future sessions.     Diagnosis:  1. " Adjustment disorder with mixed anxiety and depressed mood    2. Grief reaction        Therapist s Signature/Supervisor/co-signature statement:   Performed and documented by JUAN CARLOS Bustamante    I have read, discussed and reviewed the documentation as presented by JUAN CARLOS Bustamante, [facility]

## 2021-06-16 PROBLEM — E78.2 MIXED HYPERLIPIDEMIA: Status: ACTIVE | Noted: 2020-09-23

## 2021-06-16 PROBLEM — E66.3 OVERWEIGHT: Status: ACTIVE | Noted: 2017-12-15

## 2021-06-16 PROBLEM — Z80.0 FAMILY HISTORY OF MALIGNANT NEOPLASM OF COLON IN FIRST DEGREE RELATIVE DIAGNOSED WHEN YOUNGER THAN 60 YEARS OF AGE: Status: ACTIVE | Noted: 2017-12-15

## 2021-06-16 PROBLEM — E11.9 TYPE 2 DIABETES MELLITUS WITHOUT COMPLICATION (H): Status: ACTIVE | Noted: 2017-12-22

## 2021-06-16 PROBLEM — R07.89 ATYPICAL CHEST PAIN: Status: ACTIVE | Noted: 2021-01-14

## 2021-06-16 PROBLEM — Z72.0 TOBACCO USE: Status: ACTIVE | Noted: 2017-12-15

## 2021-06-16 PROBLEM — Z86.32 HISTORY OF GESTATIONAL DIABETES: Status: ACTIVE | Noted: 2017-12-15

## 2021-06-16 PROBLEM — G43.909 MIGRAINE WITHOUT STATUS MIGRAINOSUS, NOT INTRACTABLE, UNSPECIFIED MIGRAINE TYPE: Status: ACTIVE | Noted: 2019-07-16

## 2021-06-16 PROBLEM — R12 HEARTBURN: Status: ACTIVE | Noted: 2019-07-16

## 2021-06-16 PROBLEM — F43.21 GRIEF REACTION: Status: ACTIVE | Noted: 2017-12-15

## 2021-06-16 PROBLEM — I20.89 ATYPICAL ANGINA (H): Status: ACTIVE | Noted: 2021-01-14

## 2021-06-16 NOTE — TELEPHONE ENCOUNTER
Refill Approved    Rx renewed per Medication Renewal Policy. Medication was last renewed on 4/3/20.    Eliazar Helms, [facility] Triage/Med Refill 3/19/2021     Requested Prescriptions   Pending Prescriptions Disp Refills     metFORMIN (GLUCOPHAGE) 1000 MG tablet [Pharmacy Med Name: METFORMIN HCL 1,000 MG TABLET] 180 tablet 0     Sig: TAKE 1 TABLET BY MOUTH TWICE DAILY WITH MEALS       Metformin Refill Protocol Passed - 3/17/2021  5:18 PM        Passed - Blood pressure in last 12 months     BP Readings from Last 1 Encounters:   02/11/21 (!) 141/95             Passed - LFT or AST or ALT in last 12 months     Albumin   Date Value Ref Range Status   10/03/2020 3.7 3.5 - 5.0 g/dL Final     Bilirubin, Total   Date Value Ref Range Status   10/03/2020 0.3 0.0 - 1.0 mg/dL Final     Bilirubin, Direct   Date Value Ref Range Status   10/03/2020 0.1 <=0.5 mg/dL Final     Alkaline Phosphatase   Date Value Ref Range Status   10/03/2020 66 45 - 120 U/L Final     AST   Date Value Ref Range Status   10/03/2020 15 0 - 40 U/L Final     ALT   Date Value Ref Range Status   10/03/2020 20 0 - 45 U/L Final     Protein, Total   Date Value Ref Range Status   10/03/2020 7.3 6.0 - 8.0 g/dL Final                Passed - GFR or Serum Creatinine in last 6 months     GFR MDRD Non Af Amer   Date Value Ref Range Status   01/05/2021 >60 >60 mL/min/1.73m2 Final     GFR MDRD Af Amer   Date Value Ref Range Status   01/05/2021 >60 >60 mL/min/1.73m2 Final             Passed - Visit with PCP or prescribing provider visit in last 6 months or next 3 months     Last office visit with prescriber/PCP: 1/8/2021 OR same dept: 1/8/2021 Gareth Kimble MD OR same specialty: 1/8/2021 Gareth Kimble MD Last physical: Visit date not found Last MTM visit: Visit date not found         Next appt within 3 mo: Visit date not found  Next physical within 3 mo: Visit date not found  Prescriber OR PCP: Gareth Kimble MD  Last diagnosis associated with med order: 1. Type 2 diabetes  mellitus without complication, without long-term current use of insulin (H)  - metFORMIN (GLUCOPHAGE) 1000 MG tablet [Pharmacy Med Name: METFORMIN HCL 1,000 MG TABLET]; TAKE 1 TABLET BY MOUTH TWICE DAILY WITH MEALS  Dispense: 180 tablet; Refill: 0     If protocol passes may refill for 12 months if within 3 months of last provider visit (or a total of 15 months).           Passed - A1C in last 6 months     Hemoglobin A1c   Date Value Ref Range Status   09/23/2020 7.9 (H) <=5.6 % Final     Comment:     Normal <5.7% Prediabete 5.7-6.4% Diabletes 6.5% or higher - adopted from ADA consensus guidelines               Passed - Microalbumin in last year      Microalbumin, Random Urine   Date Value Ref Range Status   09/23/2020 2.01 (H) 0.00 - 1.99 mg/dL Final

## 2021-06-17 NOTE — TELEPHONE ENCOUNTER
Telephone Encounter by Anisa Massey CMA at 3/4/2021  2:24 PM     Author: Anisa Massey CMA Service: -- Author Type: Certified Medical Assistant    Filed: 3/4/2021  2:27 PM Encounter Date: 3/4/2021 Status: Signed    : Anisa Massey CMA (Certified Medical Assistant)         Patient Quality Outreach          Summary:    Patient has the following on her problem list/HM:     Diabetes    Last A1C:   Lab Results   Component Value Date    HGBA1C 7.9 (H) 09/23/2020       Last LDL:   Lab Results   Component Value Date    LDLCALC 130 (H) 09/23/2020       Is the patient on a Statin? Yes           Is the patient on Aspirin? unknown '    Medications     Antihyperlipidemic - HMG CoA Reductase Inhibitors (statins) Disp Start End     atorvastatin (LIPITOR) 40 MG tablet    90 tablet 9/10/2020     Sig: Take 1 tablet (40 mg total) by mouth daily.    Route: Oral           Last three blood pressure readings:  BP Readings from Last 3 Encounters:   02/11/21 (!) 141/95   02/11/21 120/77   01/14/21 122/80            Tobacco Use      Smoking status: Current Some Day Smoker      Smokeless tobacco: Never Used      Patient is due/failing the following:   Diabetic Follow-Up Visit and Annual Wellness    Type of outreach:    Phone, left message for patient/parent to call back.    Questions for provider review:    None                                                                                                                                        Anisa Massey      Chart routed to Care Team.

## 2021-06-19 NOTE — LETTER
Letter by Gareth Kimble MD at      Author: Gareth Kimble MD Service: -- Author Type: --    Filed:  Encounter Date: 7/23/2019 Status: (Other)         Jonathan Dominguez  [address] [address]             July 23, 2019         Dear Ms. Dominguez,    Your pap smear is normal.    Please call with questions or contact us using [employer]    Sincerely,        Electronically signed by Gareth Kimble MD

## 2021-06-20 NOTE — LETTER
Letter by Gareth Kimble MD at      Author: Gareth Kimble MD Service: -- Author Type: --    Filed:  Encounter Date: 1/7/2020 Status: Signed         Jonathan Dominguez  [address] [place] [id number]                     January 7, 2020    Dear Jonathan:    At the [facility], we care about you and your health. When diagnosed early, many diseases and illness can be treated and possibly prevented. That's why it is important to see your primary care provider regularly for routine examinations and to maintain your overall health.We are trying to contact you to ensure you receive the best level of care. Our records show that you are overdue for your follow up for diabetes management.  Please contact our office at [phone] to schedule an appointment.  If you are receiving care elsewhere, please contact us so that we can update our records.   Thank you for trusting us with your care.       If you have any questions or concerns, please don't hesitate to call.    Sincerely,        Electronically signed by Gareth Kimble MD

## 2021-06-21 NOTE — LETTER
Letter by Fay Evangelista RN at      Author: Fay Evangelista RN Service: -- Author Type: --    Filed:  Encounter Date: 2/11/2021 Status: (Other)         Jonathan Dominguez  [address]  [facility] [address]             February 11, 2021         Dear Ms. Dominguez,    Below are the results from your recent visit:    Resulted Orders   Echo Complete   Result Value Ref Range    Narrative      Left ventricle ejection fraction is normal. The calculated left   ventricular ejection fraction is 58% without wall motion abnormality.    Normal right ventricular size with normal systolic function.    No significant valve disease.    No previous study for comparison.        Resulted Orders   CTA Coronary W Calcium Score   Result Value Ref Range    HR 62 bpm    BSA 1.7 m2    Agatston Score of Left Main 0     Agatston Score of the Left Anterior Descending 0     Agatston Score of Circumflex 0     Agatston Score of Right Coronary Artery 0     Total Score 0.00     Narrative      The total Agatston calcium score is 0. A calcium score of zero places   the individual in the lowest quartile when compared to an age and gender   matched control group and implies a low risk of cardiac events in the next   10 years. (less than 1% per year).    Minimal coronary atherosclerosis no obstructive plaque identified          As we discussed on the phone, Dr. Gardner notes that your Echocardiogram is negative and you only have minimal plaque in your coronary arteries. This is great news! However, given your risk profile-- to help prevent further plaque build-up, Dr. Gardner recommends that you take Atorvastatin 80 mg daily. You should have a recheck of your cholesterol and liver function labs in 3 months.     Please call with questions or contact us using [employer].    Sincerely,        MITCH Aponte  On behalf of Dr. Alejandro Gardner.

## 2021-06-21 NOTE — LETTER
Letter by Gareth Kimble MD at      Author: Gareth Kimble MD Service: -- Author Type: --    Filed:  Encounter Date: 3/19/2021 Status: (Other)         Jonathan Dominguez  [address] [place] [id number]                     March 19, 2021    Dear Jonathan:    At the [facility], we care about you and your health. When diagnosed early, many diseases and illness can be treated and possibly prevented. That's why it is important to see your primary care provider regularly for routine examinations and to maintain your overall health.We are trying to contact you to ensure you receive the best level of care. Our records show that you are overdue for a Physical and Diabetes follow up.  Please contact our office at [phone] to schedule an appointment.  If you are receiving care elsewhere, please contact us so that we can update our records.   Thank you for trusting us with your care.       If you have any questions or concerns, please don't hesitate to call.    Sincerely,  [facility] Staff        Electronically signed by Gareth Kimble MD

## 2021-07-03 NOTE — ADDENDUM NOTE
Addendum Note by Nata Worley MD at 11/7/2017  3:02 PM     Author: Nata Worley MD Service: -- Author Type: Physician    Filed: 11/7/2017  3:02 PM Encounter Date: 10/31/2017 Status: Signed    : Nata Wolrey MD (Physician)    Addended by: NATA WORLEY on: 11/7/2017 03:02 PM        Modules accepted: Orders